# Patient Record
Sex: MALE
[De-identification: names, ages, dates, MRNs, and addresses within clinical notes are randomized per-mention and may not be internally consistent; named-entity substitution may affect disease eponyms.]

---

## 2017-06-02 ENCOUNTER — HOSPITAL ENCOUNTER (INPATIENT)
Dept: HOSPITAL 14 - H.ER | Age: 67
LOS: 4 days | Discharge: NURSING, REHAB OR LONG-TERM CARE | DRG: 871 | End: 2017-06-06
Attending: INTERNAL MEDICINE | Admitting: INTERNAL MEDICINE
Payer: MEDICAID

## 2017-06-02 DIAGNOSIS — A41.9: Primary | ICD-10-CM

## 2017-06-02 DIAGNOSIS — E78.00: ICD-10-CM

## 2017-06-02 DIAGNOSIS — N17.9: ICD-10-CM

## 2017-06-02 DIAGNOSIS — I10: ICD-10-CM

## 2017-06-02 DIAGNOSIS — Z87.01: ICD-10-CM

## 2017-06-02 DIAGNOSIS — E11.649: ICD-10-CM

## 2017-06-02 DIAGNOSIS — Z95.5: ICD-10-CM

## 2017-06-02 DIAGNOSIS — A04.7: ICD-10-CM

## 2017-06-02 DIAGNOSIS — I25.10: ICD-10-CM

## 2017-06-02 DIAGNOSIS — B96.20: ICD-10-CM

## 2017-06-02 DIAGNOSIS — Z79.82: ICD-10-CM

## 2017-06-02 DIAGNOSIS — E86.0: ICD-10-CM

## 2017-06-02 DIAGNOSIS — R65.20: ICD-10-CM

## 2017-06-02 DIAGNOSIS — G93.41: ICD-10-CM

## 2017-06-02 DIAGNOSIS — N39.0: ICD-10-CM

## 2017-06-02 DIAGNOSIS — E66.9: ICD-10-CM

## 2017-06-02 DIAGNOSIS — E87.6: ICD-10-CM

## 2017-06-02 DIAGNOSIS — L89.152: ICD-10-CM

## 2017-06-02 DIAGNOSIS — L97.519: ICD-10-CM

## 2017-06-02 DIAGNOSIS — Z87.891: ICD-10-CM

## 2017-06-02 DIAGNOSIS — L60.8: ICD-10-CM

## 2017-06-02 DIAGNOSIS — Z89.421: ICD-10-CM

## 2017-06-02 LAB
ALBUMIN/GLOB SERPL: 1.1 {RATIO} (ref 1–2.1)
ALP SERPL-CCNC: 77 U/L (ref 38–126)
ALT SERPL-CCNC: 29 U/L (ref 21–72)
APTT BLD: 27 SECONDS (ref 23.3–32.5)
ARTERIAL  BLOOD GAS MODE: (no result)
ARTERIAL PATENCY WRIST A: YES
AST SERPL-CCNC: 22 U/L (ref 17–59)
BACTERIA #/AREA URNS HPF: (no result) /[HPF]
BASOPHILS # BLD AUTO: 0.1 K/UL (ref 0–0.2)
BASOPHILS NFR BLD: 0.6 % (ref 0–2)
BILIRUB SERPL-MCNC: 0.8 MG/DL (ref 0.2–1.3)
BILIRUB UR-MCNC: (no result) MG/DL
BUN SERPL-MCNC: 40 MG/DL (ref 9–20)
CALCIUM SERPL-MCNC: 9.1 MG/DL (ref 8.4–10.2)
CHLORIDE SERPL-SCNC: 97 MMOL/L (ref 98–107)
CO2 SERPL-SCNC: 27 MMOL/L (ref 22–30)
COLOR UR: YELLOW
EOSINOPHIL # BLD AUTO: 0.4 K/UL (ref 0–0.7)
EOSINOPHIL NFR BLD: 2 % (ref 0–4)
ERYTHROCYTE [DISTWIDTH] IN BLOOD BY AUTOMATED COUNT: 16.6 % (ref 11.5–14.5)
GLOBULIN SER-MCNC: 3.3 GM/DL (ref 2.2–3.9)
GLUCOSE SERPL-MCNC: 51 MG/DL (ref 75–110)
GLUCOSE UR STRIP-MCNC: >=500 MG/DL
HCO3 BLDA-SCNC: 26 MMOL/L (ref 21–28)
HCT VFR BLD CALC: 40.1 % (ref 35–51)
KETONES UR STRIP-MCNC: NEGATIVE MG/DL
LEUKOCYTE ESTERASE UR-ACNC: (no result) LEU/UL
LYMPHOCYTES # BLD AUTO: 2.3 K/UL (ref 1–4.3)
LYMPHOCYTES NFR BLD AUTO: 11.6 % (ref 20–40)
MAGNESIUM SERPL-MCNC: 1.6 MG/DL (ref 1.6–2.3)
MCH RBC QN AUTO: 25.7 PG (ref 27–31)
MCHC RBC AUTO-ENTMCNC: 32 G/DL (ref 33–37)
MCV RBC AUTO: 80.2 FL (ref 80–94)
MONOCYTES # BLD: 1.9 K/UL (ref 0–0.8)
MONOCYTES NFR BLD: 9.6 % (ref 0–10)
NEUTROPHILS # BLD: 15.1 K/UL (ref 1.8–7)
NEUTROPHILS NFR BLD AUTO: 76.2 % (ref 50–75)
NRBC BLD AUTO-RTO: 0.1 % (ref 0–0)
PH BLDA: 7.37 [PH] (ref 7.35–7.45)
PH UR STRIP: 5 [PH] (ref 5–8)
PHOSPHATE SERPL-MCNC: 4.1 MG/DL (ref 2.5–4.5)
PLATELET # BLD: 225 K/UL (ref 130–400)
PMV BLD AUTO: 9.2 FL (ref 7.2–11.7)
PO2 BLDA: 102 MM/HG (ref 80–100)
POTASSIUM SERPL-SCNC: 3.6 MMOL/L (ref 3.6–5)
PROT SERPL-MCNC: 6.9 G/DL (ref 6.3–8.2)
PROT UR STRIP-MCNC: 100 MG/DL
RBC # UR STRIP: NEGATIVE /UL
RBC #/AREA URNS HPF: 16 /HPF (ref 0–3)
SODIUM SERPL-SCNC: 138 MMOL/L (ref 132–148)
SP GR UR STRIP: 1.02 (ref 1–1.03)
UROBILINOGEN UR-MCNC: (no result) MG/DL (ref 0.2–1)
WBC # BLD AUTO: 19.8 K/UL (ref 4.8–10.8)
WBC #/AREA URNS HPF: 4111 /HPF (ref 0–5)
WBC CLUMPS # UR AUTO: (no result) /HPF

## 2017-06-02 RX ADMIN — WATER SCH MLS/HR: 1 INJECTION INTRAMUSCULAR; INTRAVENOUS; SUBCUTANEOUS at 22:43

## 2017-06-02 RX ADMIN — INSULIN LISPRO SCH: 100 INJECTION, SOLUTION INTRAVENOUS; SUBCUTANEOUS at 13:48

## 2017-06-02 RX ADMIN — DEXTROSE AND SODIUM CHLORIDE SCH MLS/HR: 5; 450 INJECTION, SOLUTION INTRAVENOUS at 17:06

## 2017-06-02 RX ADMIN — WATER SCH MLS/HR: 1 INJECTION INTRAMUSCULAR; INTRAVENOUS; SUBCUTANEOUS at 13:54

## 2017-06-02 RX ADMIN — INSULIN LISPRO SCH: 100 INJECTION, SOLUTION INTRAVENOUS; SUBCUTANEOUS at 16:53

## 2017-06-02 RX ADMIN — WATER SCH MLS/HR: 1 INJECTION INTRAMUSCULAR; INTRAVENOUS; SUBCUTANEOUS at 17:02

## 2017-06-02 RX ADMIN — DEXTROSE AND SODIUM CHLORIDE SCH MLS/HR: 5; 450 INJECTION, SOLUTION INTRAVENOUS at 13:47

## 2017-06-02 RX ADMIN — INSULIN LISPRO SCH: 100 INJECTION, SOLUTION INTRAVENOUS; SUBCUTANEOUS at 07:59

## 2017-06-02 RX ADMIN — METRONIDAZOLE SCH MLS/HR: 500 INJECTION, SOLUTION INTRAVENOUS at 13:48

## 2017-06-02 RX ADMIN — INSULIN LISPRO SCH: 100 INJECTION, SOLUTION INTRAVENOUS; SUBCUTANEOUS at 22:41

## 2017-06-02 RX ADMIN — ENOXAPARIN SODIUM SCH: 40 INJECTION SUBCUTANEOUS at 16:54

## 2017-06-02 NOTE — CP.PCM.HP
History of Present Illness





- History of Present Illness


History of Present Illness: 





PCP: Teofilo Steele MD





Chief Complaint: Fever/ Hypotension





HPI: The hx is obtained from the nursing Home's notes as the patient is very 

sleepy. He is a 66 years old male r with hx of DM II, sent from the Nursing 

home with Fever and Hypotension. He denies chest Pain , SOB, Abdominal pain, 

cough, n/v. In the ED he was found to be having diarrhea.








PMH. DM II, probably CHF





PSH. right second toe partial amputation





SH:  living in a Nursing home/Assist Living





FH:  No Known Family Hx





Allergies: NKDA








Present on Admission





- Present on Admission


Any Indicators Present on Admission: No


History of DVT/PE: No


History of Uncontrolled Diabetes: No


Urinary Catheter: No


Decubitus Ulcer Present: No





Review of Systems





- Review of Systems


Review of Systems: 





Review of system is limited because the patient is sleepy more than lethargic.





Past Patient History





- Past Medical History & Family History


Past Medical History?: Yes





- Past Social History


Smoking Status: Unknown


Chewing Tobacco Use: No


Cigar Use: No


Alcohol: Other


Home Situation {Lives}: Nursing Home





- CARDIAC


Hx Cardiac Disorders: No





- PULMONARY


Hx Respiratory Disorders: No





- NEUROLOGICAL


Hx Neurological Disorder: No





- HEENT


Hx HEENT Problems: No





- RENAL


Hx Chronic Kidney Disease: No





- ENDOCRINE/METABOLIC


Hx Endocrine Disorders: Yes


Hx Diabetes Mellitus Type 2: Yes





- HEMATOLOGICAL/ONCOLOGICAL


Hx Blood Disorders: No





- INTEGUMENTARY


Hx Dermatological Problems: No





- MUSCULOSKELETAL/RHEUMATOLOGICAL


Hx Musculoskeletal Disorders: No





- GASTROINTESTINAL


Hx Gastrointestinal Disorders: No





- GENITOURINARY/GYNECOLOGICAL


Hx Genitourinary Disorders: No





- PSYCHIATRIC


Hx Psychophysiologic Disorder: No





- SURGICAL HISTORY


Hx Surgeries: Yes


Other/Comment: Righe second toe partial amputation





- ANESTHESIA


Hx Anesthesia: Yes


Hx Anesthesia Reactions: No





Meds


Allergies/Adverse Reactions: 


 Allergies











Allergy/AdvReac Type Severity Reaction Status Date / Time


 


No Known Allergies Allergy   Verified 06/02/17 02:23














Physical Exam





- Constitutional


Appears: In Acute Distress





- Head Exam


Head Exam: ATRAUMATIC, NORMAL INSPECTION, NORMOCEPHALIC





- Eye Exam


Eye Exam: EOMI, Normal appearance


Pupil Exam: NORMAL ACCOMODATION





- ENT Exam


ENT Exam: Mucous Membranes Moist, Normal External Ear Exam





- Neck Exam


Neck exam: Positive for: Normal Inspection.  Negative for: Lymphadenopathy, 

Tenderness





- Respiratory Exam


Respiratory Exam: Clear to Auscultation Bilateral.  absent: Rales, Rhonchi, 

Wheezes





- Cardiovascular Exam


Cardiovascular Exam: REGULAR RHYTHM, RRR, +S1, +S2





- GI/Abdominal Exam


Additional comments: 





Obese, soft, non tender, no viceromegales, +ve bowel sounds





- Rectal Exam


Rectal Exam: Deferred





- Extremities Exam


Additional comments: 





Distal half of the right leg with hyperpigmented blackish brown colored 

leathery skin, tender to deep palpation. The left leg ankle region is also 

tender to palpation. Pedalis dorsales are deminished bilateral, Trace edema





- Back Exam


Additional comments: 





Stage I and II ulceration s at the sacral and lower back region.





- Neurological Exam


Neurological exam: CN II-XII Intact, Reflexes Normal


Additional comments: 





Sleepy, no apparent focal deficit





- Psychiatric Exam


Psychiatric exam: Flat Affect





- Skin


Skin Exam: Dry, Intact, Normal Color, Warm





Results





- Vital Signs


Recent Vital Signs: 





 Last Vital Signs











Temp  100.3 F H  06/02/17 03:07


 


Pulse  90   06/02/17 03:07


 


Resp  18   06/02/17 03:07


 


BP  91/49 L  06/02/17 03:07


 


Pulse Ox  100   06/02/17 04:25














- Labs


Result Diagrams: 


 06/02/17 03:33





 06/02/17 03:33


Labs: 





 Laboratory Results - last 24 hr











  06/02/17 06/02/17 06/02/17





  02:37 03:26 03:33


 


WBC    19.8 H


 


RBC    5.00


 


Hgb    12.8


 


Hct    40.1


 


MCV    80.2


 


MCH    25.7 L


 


MCHC    32.0 L


 


RDW    16.6 H


 


Plt Count    225


 


MPV    9.2


 


Neut % (Auto)    76.2 H


 


Lymph % (Auto)    11.6 L


 


Mono % (Auto)    9.6


 


Eos % (Auto)    2.0


 


Baso % (Auto)    0.6


 


Neut #    15.1 H


 


Lymph #    2.3


 


Mono #    1.9 H


 


Eos #    0.4


 


Baso #    0.1


 


PT   


 


INR   


 


APTT   


 


pCO2   47 H 


 


pO2   102 H 


 


HCO3   26.0 


 


ABG pH   7.37 


 


ABG Total CO2   28.6 H 


 


ABG O2 Saturation   99.3 H 


 


ABG Base Excess   1.3 


 


Edwar Test   Yes 


 


ABG Potassium   3.1 L 


 


A-a O2 Difference   124.0 


 


Sodium   135.0 


 


Chloride   104.0 


 


Glucose   38 L* 


 


Lactate   0.8 


 


Vent Mode   5l nc 


 


FiO2   40.0 


 


Blood Gas Comments   5l nc 


 


Crit Value Called To   Khushi jay md 


 


Crit Value Called By   333 


 


Crit Value Read Back   Y 


 


Blood Gas Notified Time   335 


 


Potassium   


 


Carbon Dioxide   


 


Anion Gap   


 


BUN   


 


Creatinine   


 


Est GFR ( Amer)   


 


Est GFR (Non-Af Amer)   


 


POC Glucose (mg/dL)  62 L  


 


Random Glucose   


 


Lactic Acid   


 


Calcium   


 


Phosphorus   


 


Magnesium   


 


Total Bilirubin   


 


AST   


 


ALT   


 


Alkaline Phosphatase   


 


Total Protein   


 


Albumin   


 


Globulin   


 


Albumin/Globulin Ratio   


 


Arterial Blood Potassium   3.1 L 


 


Urine Color   


 


Urine Clarity   


 


Urine pH   


 


Ur Specific Gravity   


 


Urine Protein   


 


Urine Glucose (UA)   


 


Urine Ketones   


 


Urine Blood   


 


Urine Nitrate   


 


Urine Bilirubin   


 


Urine Urobilinogen   


 


Ur Leukocyte Esterase   


 


Urine RBC (Auto)   


 


Urine WBC Clumps (Auto)   


 


Urine Microscopic WBC   


 


Urine Bacteria   


 


Hyaline Casts   














  06/02/17 06/02/17 06/02/17





  03:33 03:33 03:33


 


WBC   


 


RBC   


 


Hgb   


 


Hct   


 


MCV   


 


MCH   


 


MCHC   


 


RDW   


 


Plt Count   


 


MPV   


 


Neut % (Auto)   


 


Lymph % (Auto)   


 


Mono % (Auto)   


 


Eos % (Auto)   


 


Baso % (Auto)   


 


Neut #   


 


Lymph #   


 


Mono #   


 


Eos #   


 


Baso #   


 


PT   11.4 H 


 


INR   1.10 H 


 


APTT   27.0 


 


pCO2   


 


pO2   


 


HCO3   


 


ABG pH   


 


ABG Total CO2   


 


ABG O2 Saturation   


 


ABG Base Excess   


 


Edwar Test   


 


ABG Potassium   


 


A-a O2 Difference   


 


Sodium  138  


 


Chloride  97 L  


 


Glucose   


 


Lactate   


 


Vent Mode   


 


FiO2   


 


Blood Gas Comments   


 


Crit Value Called To   


 


Crit Value Called By   


 


Crit Value Read Back   


 


Blood Gas Notified Time   


 


Potassium  3.6  


 


Carbon Dioxide  27  


 


Anion Gap  18  


 


BUN  40 H  


 


Creatinine  1.9 H  


 


Est GFR ( Amer)  43  


 


Est GFR (Non-Af Amer)  35  


 


POC Glucose (mg/dL)   


 


Random Glucose  51 L  


 


Lactic Acid   


 


Calcium  9.1  


 


Phosphorus  4.1  


 


Magnesium  1.6  


 


Total Bilirubin  0.8  


 


AST  22  


 


ALT  29  


 


Alkaline Phosphatase  77  


 


Total Protein  6.9  


 


Albumin  3.6  


 


Globulin  3.3  


 


Albumin/Globulin Ratio  1.1  


 


Arterial Blood Potassium   


 


Urine Color    Yellow


 


Urine Clarity    Turbid


 


Urine pH    5.0


 


Ur Specific Gravity    1.022


 


Urine Protein    100


 


Urine Glucose (UA)    >=500


 


Urine Ketones    Negative


 


Urine Blood    Negative


 


Urine Nitrate    Negative


 


Urine Bilirubin    Small


 


Urine Urobilinogen    0.2-1.0


 


Ur Leukocyte Esterase    Mod


 


Urine RBC (Auto)    16 H


 


Urine WBC Clumps (Auto)    Many H


 


Urine Microscopic WBC    4111 H


 


Urine Bacteria    Small


 


Hyaline Casts    0-2














  06/02/17 06/02/17





  03:33 04:15


 


WBC  


 


RBC  


 


Hgb  


 


Hct  


 


MCV  


 


MCH  


 


MCHC  


 


RDW  


 


Plt Count  


 


MPV  


 


Neut % (Auto)  


 


Lymph % (Auto)  


 


Mono % (Auto)  


 


Eos % (Auto)  


 


Baso % (Auto)  


 


Neut #  


 


Lymph #  


 


Mono #  


 


Eos #  


 


Baso #  


 


PT  


 


INR  


 


APTT  


 


pCO2  


 


pO2  


 


HCO3  


 


ABG pH  


 


ABG Total CO2  


 


ABG O2 Saturation  


 


ABG Base Excess  


 


Edwar Test  


 


ABG Potassium  


 


A-a O2 Difference  


 


Sodium  


 


Chloride  


 


Glucose  


 


Lactate  


 


Vent Mode  


 


FiO2  


 


Blood Gas Comments  


 


Crit Value Called To  


 


Crit Value Called By  


 


Crit Value Read Back  


 


Blood Gas Notified Time  


 


Potassium  


 


Carbon Dioxide  


 


Anion Gap  


 


BUN  


 


Creatinine  


 


Est GFR ( Amer)  


 


Est GFR (Non-Af Amer)  


 


POC Glucose (mg/dL)   44 L


 


Random Glucose  


 


Lactic Acid  1.8 


 


Calcium  


 


Phosphorus  


 


Magnesium  


 


Total Bilirubin  


 


AST  


 


ALT  


 


Alkaline Phosphatase  


 


Total Protein  


 


Albumin  


 


Globulin  


 


Albumin/Globulin Ratio  


 


Arterial Blood Potassium  


 


Urine Color  


 


Urine Clarity  


 


Urine pH  


 


Ur Specific Gravity  


 


Urine Protein  


 


Urine Glucose (UA)  


 


Urine Ketones  


 


Urine Blood  


 


Urine Nitrate  


 


Urine Bilirubin  


 


Urine Urobilinogen  


 


Ur Leukocyte Esterase  


 


Urine RBC (Auto)  


 


Urine WBC Clumps (Auto)  


 


Urine Microscopic WBC  


 


Urine Bacteria  


 


Hyaline Casts  














- EKG Data


EKG comments: 





NSR 99/min


RBBB





- Imaging and Cardiology


  ** Chest x-ray


Status: Image reviewed by me


Additional comment: 





Poor inspiration, Obliteration at the left base


Probably mild infiltrate at right apex





Assessment & Plan





- Assessment and Plan (Free Text)


Assessment: 





#. UTI


#Severe Sepsis


#. Leukocytosis


#. MAREK


#.DM II with Hypoglycemia


#. Diarrhea


Plan: 





66 years old male r with hx of DM II, sent from the Nursing home with Fever and 

Hypotension. He denies chest Pain , SOB, Abdominal pain, cough, n/v. In the ED 

he was found to be having diarrhea.





#. UTI 


-Zosyn 3.375mg IVPB Q6H


- Follow up Urine culture





#Severe Sepsis with a leukocytosis of 19.8/temperature of > 100F and Initial BP 

Of 82/44mmHg


- consult Dr neumann Infectious disease


- Follow blood culture and urine culture


- Zosyn IVPB Q6H





#.Neutrophilic Leukocytosis


- follow WBC





#. MAREK


- IV Fluid with NS at 150mls/hr


- follow Renal labs





#.DM II with Hypoglycemia probably due to poor food intake while taking anti 

diabetic medication


- Hold Invokana and Metformin/Victosa


- Diabetic diet


- Novolog sliding scale according toaccucheck


- HbA1c





#. Diarrhea r/o C diff


- stool for C Diff and C&S


- IV Fluids





#. DVT Prophylaxis with lovenox





#. Code status: Full





- Date & Time


Date: 06/02/17


Time: 05:14

## 2017-06-02 NOTE — CARD
--------------- APPROVED REPORT --------------





EKG Measurement

Heart [id number]

[id number]

[id number]

[id number]

[id number]



<Conclusion>

Normal sinus rhythm

Left axis deviation

Right bundle branch block

Septal infarct, age undetermined

Abnormal ECG

## 2017-06-02 NOTE — CP.PCM.CON
History of Present Illness





- History of Present Illness


History of Present Illness: 





66 years old male r with hx of DM II, sent from the Nursing home with Fever and 

Hypotension. He denies chest Pain , SOB, Abdominal pain, cough, n/v. In the ED 

he was found to be having diarrhea.








PMH. DM II, probably CHF





PSH. right second toe partial amputation





SH:  living in a Nursing home/Assist Living





FH:  No Known Family Hx





Allergies: NKDA








Past Patient History





- Past Medical History & Family History


Past Medical History?: Yes





- Past Social History


Smoking Status: Former Smoker





- CARDIAC


Hx Cardiac Disorders: No


Hx Hypercholesterolemia: Yes


Hx Hypertension: Yes


Other/Comment: cad with stents





- PULMONARY


Hx Respiratory Disorders: Yes


Hx Pneumonia: Yes





- NEUROLOGICAL


Hx Neurological Disorder: No





- HEENT


Hx HEENT Problems: No





- RENAL


Other/Comment: ESRD





- ENDOCRINE/METABOLIC


Hx Endocrine Disorders: Yes


Hx Diabetes Mellitus Type 2: Yes





- HEMATOLOGICAL/ONCOLOGICAL


Hx Blood Disorders: No





- INTEGUMENTARY


Other/Comment: Hx Decubitus.  Hx perineal/buttocks excoriation





- MUSCULOSKELETAL/RHEUMATOLOGICAL


Hx Musculoskeletal Disorders: No


Hx Falls: No





- GASTROINTESTINAL


Hx Gastrointestinal Disorders: Yes


Other/Comment: c diff





- GENITOURINARY/GYNECOLOGICAL


Hx Genitourinary Disorders: No





- PSYCHIATRIC


Hx Psychophysiologic Disorder: No


Hx Substance Use: No





- SURGICAL HISTORY


Hx Surgeries: Yes


Other/Comment: Righe second toe partial amputation





- ANESTHESIA


Hx Anesthesia: Yes


Hx Anesthesia Reactions: No





Meds


Allergies/Adverse Reactions: 


 Allergies











Allergy/AdvReac Type Severity Reaction Status Date / Time


 


No Known Allergies Allergy   Verified 06/02/17 02:23














- Medications


Medications: 


 Current Medications





Acetaminophen (Tylenol 325mg Tab)  650 mg PO Q6 PRN


   PRN Reason: temp> 101 or mild pain 


Aspirin (Ecotrin)  81 mg PO DAILY [facility]


Atorvastatin Calcium (Lipitor)  10 mg PO HS [facility]


Bacitracin (Bacitracin Oint)  1 applic TOP DAILY [facility]


Carvedilol (Coreg)  6.25 mg PO HS [facility]


Cholecalciferol (Vitamin D)  1,000 iu PO DAILY [facility]


Enoxaparin Sodium (Lovenox)  40 mg SC DAILY [facility]


   PRN Reason: Protocol


Gabapentin (Neurontin)  600 mg PO TID [facility]


Home Med (Cholecalciferol (Vitamin D3) [Vitamin D3])  1,000 unit PO DAILY [facility]


Piperacillin Sod/Tazobactam (Sod 3.375 gm/ Sodium Chloride)  100 mls @ 100 mls/

hr IVPB Q6 [facility]


Sodium Chloride (Sodium Chloride 0.9%)  1,000 mls @ 150 mls/hr IV .Q6H40M [facility]


   Stop: 06/03/17 07:32


Dextrose/Sodium Chloride (Dextrose 5%/0.45% Ns 1000 Ml)  1,000 mls @ 150 mls/hr 

IV .Q6H40M [facility]


   Stop: 06/03/17 07:53


Metronidazole (Flagyl 500mg/100ml Ns)  100 mls @ 100 mls/hr IVPB Q8 [facility]


Insulin Human Lispro (Humalog)  0 units SC ACHS [facility]


   PRN Reason: Protocol


   Last Admin: 06/02/17 07:59 Dose:  Not Given


Pantoprazole Sodium (Protonix Ec Tab)  40 mg PO DAILY [facility]


Ramipril (Altace)  5 mg PO DAILY [facility]











Results





- Vital Signs


Recent Vital Signs: 


 Last Vital Signs











Temp  97.8 F   06/02/17 12:28


 


Pulse  86   06/02/17 12:45


 


Resp  18   06/02/17 12:45


 


BP  103/55 L  06/02/17 12:28


 


Pulse Ox  98   06/02/17 12:28














- Labs


Result Diagrams: 


 06/02/17 03:33





 06/02/17 03:33


Labs: 


 Laboratory Results - last 24 hr











  06/02/17 06/02/17 06/02/17





  05:16 11:01 13:18


 


POC Glucose (mg/dL)   70  114 H


 


C. difficile Ag & Toxin  Positive antigen

## 2017-06-02 NOTE — CP.CCUPN
CCU Subjective





- Physician Review


Subjective (Free Text): 











***INTENSIVIST PROGRESS NOTE***


Patient examined, interim events reviewed, discussed with PMD:





66M admitted after transfer from NH for fever and hypotension. Patient is a 

poor historian and information obtained from the review of available chart 

records. Initial BP 80/40s and after 3 liters NSS, SBP celestine to approx. 100. He 

was afebrile on presentation, but search for source of sepsis noted only 

moderate pyuria, but neg nitrite in UA. CXR not conclusive enough for any 

definite consolidation and no old films for comparison. He exhibited frequent 

loose stools/ diarrheal BMs in the ER. Mental status slightly improved after 

given supplemental IV dextrose for hypoglycemia.  Presently sleeping but 

arousable to verbal stimuli, speaks Egyptian, does not appear distressed nor 

uncomfortable.





Other vitals:  Afebrile, Tmax 100.3F  /72 HR 96, 100% SPO2 on NC. 


Allergies: NKDA


NH meds:  Invokana, Tresiba, Victoza, Glucophage; Altace, Coreg, Lasix, 

Gabapentin, Ecotrin, Atorvastatin, Vit D3, Linzess, meclizine, Naprosyn, 

Omeprazole.


ROS: as above, no other pertinent negs or positives on 10 system review. 


PMFSH:  all nursing and historical notes reviewed, no new pertinent data 

relevant to current problems. 





No other distress noted: EXAM-


HEENT: no icterus, pupils equal and reactive


NECK: no visible JVD, supple, carotids equal upstroke bilat/no bruits


CHEST: decreased BS bases, no wheezes 


HEART:  regular, distant, S1S2, no murmur audible, no rubs.


ABD:  soft, no increased distention, no focal tenderness,  no HSM. BS hypoactive

, 


EXT:   no peripheral/ digital cyanosis, no calf tenderness or palpable cords, 

distal pulses intact and symmetrical


NEURO:   no gross focal motor deficits


SKIN:   no rashes





LABS: 


WBC= 19.8


HGB= 12.8


PLTs= 225K








Na= 138


K= 3.6


HCO3= 27


BUN/Cr= 40/1.9


BS= 51








CXR:  poor inspiratory film, ?/ bi-apical haziness, significant left effusion.  

(my interp). 


EKG: sinus 100, RBBB, other non-specific ST-T changes. 








MAJOR PROBLEMS NOW:


1.   Severe sepsis, r/o bacteremia, r/o AMI


2.   Urosepsis


3.   Azotemia / Dehydration, r/o ATN  MAREK


4.   Hypoglycemia with DM II


5.   Metabolic encephalopathy





PLAN:


1.    IVF hydration / fluid challenges.


2.   Supplemental Dextrose infusion with frequent Accuchecks.


3.   Panculture, empiric abx coverage started in ER with Zosyn, consider add 

Vanco and Cipro for coverage against hospital acquired organisms as he is from 

the NH. 


4.   Stool for C Diff Ag


5.   Serial Lactate levels until normalized.


6.   Hold or stop these NH meds:  Invokana, Tresiba, Victoza, Glucophage; Altace

, Coreg, Lasix, Gabapentin.


7.    Clarify any Advance Directives.

## 2017-06-02 NOTE — RAD
HISTORY:

Sepsis Patient  



COMPARISON:

None available 



FINDINGS:



LUNGS:

Bilateral apical opacity, uncertain significance.  Possible pleural 

thickening.  Follow-up advised.  Bibasilar linear subsegmental 

atelectasis.



PLEURA:

Slight blunting left costophrenic angle may reflect small pleural 

effusion or chronic pleural thickening.  No evidence of right pleural 

effusion. No pneumothorax.



CARDIOVASCULAR:

Normal.



OSSEOUS STRUCTURES:

No significant abnormalities.



VISUALIZED UPPER ABDOMEN:

Normal.



OTHER FINDINGS:

None.



IMPRESSION:

Bilateral apical hazy opacity, uncertain significance. Followup 

advised with PA and lateral chest radiography when clinically 

feasible.  Bibasilar linear atelectasis.  Possible very small left 

pleural effusion.

## 2017-06-02 NOTE — ED PDOC
HPI: General Adult


Time Seen by Provider: 17 02:27


Chief Complaint (Nursing): Fever


Chief Complaint (Provider): hypotensive, fever 


History Per: Patient


History/Exam Limitations: no limitations


Onset/Duration Of Symptoms: Mins


Have you had recent travel within the past 21 days to any of the following 

countries: [place], [place], [place] or [place]?: No


Current Symptoms Are (Timing): Still Present


Additional Complaint(s): 


65yo male presents to the ED, sent by nursing home, for evaluation of fever and 

low blood pressure. Patient denies chest pain, leg pain, abdominal pain, SOB, 

cough, n/v/d, or any other medical complaints. 





PCP: Dr. Steele 





Past Medical History


Reviewed: Historical Data, Nursing Documentation, Vital Signs


Vital Signs: 


 Last Vital Signs











Temp  97.9 F   17 08:00


 


Pulse  83   17 08:00


 


Resp  10 L  17 08:00


 


BP  100/53 L  17 08:00


 


Pulse Ox  100   17 08:00














- Medical History


PMH: CAD, Diabetes, HTN





- Surgical History


Surgical History: No Surg Hx


Other surgeries: right second toe partial amputation





- Family History


Family History: States: No Known Family Hx





- Living Arrangements


Living Arrangements: Nursing Home/[facility]ist [facility]





- Home Medications


Home Medications: 


 Ambulatory Orders











 Medication  Instructions  Recorded


 


Acetaminophen [Tylenol 325mg tab] 650 mg PO Q6 PRN 17


 


Aspirin [Adult Low Dose Aspirin EC] 81 mg PO DAILY 17


 


Atorvastatin Calcium [Atorvastatin 10 mg PO HS 17





Calcium]  


 


Bacitracin OINT [Bacitracin OINT] 1 appl TOP DAILY 17


 


Canagliflozin [Invokana] 300 mg PO DAILY 17


 


Carvedilol [Coreg] 6.25 mg PO HS 17


 


Cholecalciferol (Vitamin D3) 1,000 unit PO DAILY 17





[Vitamin D3]  


 


Furosemide [Lasix] 40 mg PO DAILY 17


 


Gabapentin [Neurontin] 600 mg PO TID 17


 


Insulin Degludec [Tresiba 60 unit SC BID 17





Flextouch U-100]  


 


Linaclotide [Linzess] 145 mcg PO DAILY 17


 


Liraglutide [Victoza 2-Surya] 1.8 mg SC HS 17


 


Magnesium Hydroxide [Milk Of 30 ml PO Q72 PRN 17





Magnesia]  


 


Meclizine HCl [Meclizine HCl] 12.5 mg PO PRN PRN 17


 


Metformin HCl [Glucophage] 1,000 mg PO BID 17


 


Naproxen [Naprosyn Tab] 375 mg PO Q12 PRN 17


 


Omeprazole [Omeprazole] 40 mg PO DAILY 17


 


Ramipril [Altace] 5 mg PO DAILY 17


 


S.boulardii/B.coagulans/Fos 1 cap PO DAILY 17





[Diff-Stat 471 mg Capsule]  














- Allergies


Allergies/Adverse Reactions: 


 Allergies











Allergy/AdvReac Type Severity Reaction Status Date / Time


 


No Known Allergies Allergy   Verified 17 02:23














Review of Systems


ROS Statement: Except As Marked, All Systems Reviewed And Found Negative


Constitutional: Positive for: Fever


Cardiovascular: Negative for: Chest Pain


Respiratory: Negative for: Cough, Shortness of Breath


Gastrointestinal: Negative for: Nausea, Vomiting, Abdominal Pain, Diarrhea


Musculoskeletal: Negative for: Leg Pain





Physical Exam





- Reviewed


Nursing Documentation Reviewed: Yes


Vital Signs Reviewed: Yes





- Physical Exam


Appears: Positive for: No Acute Distress.  Negative for: Well (ill appearing )


Head Exam: Positive for: ATRAUMATIC, NORMAL INSPECTION, NORMOCEPHALIC


Skin: Positive for: Diaphoresis.  Negative for: Warm (cool extremities )


Eye Exam: Positive for: Normal appearance, EOMI, PERRL


ENT: Positive for: Normal ENT Inspection, Other (moist mucous membranes )


Neck: Positive for: Normal, Painless ROM, Supple


Cardiovascular/Chest: Positive for: Tachycardia (but regular rhythm ).  

Negative for: Murmur


Respiratory: Positive for: Normal Breath Sounds.  Negative for: Wheezing, 

Respiratory Distress


Pulses-Dorsalis Pedis (L): 2+


Pulses-Dorsalis Pedis (R): 2+


Pulses-Radial (L): 2+


Pulses-Radial (R): 2+


Gastrointestinal/Abdominal: Positive for: Normal Exam, Soft.  Negative for: 

Tenderness, Distended


Back: Positive for: Normal Inspection, Other (decubitus pressure ulcers to 

buttock and perineum stage 2 ).  Negative for: L CVA Tenderness, R CVA 

Tenderness


Extremity: Positive for: Normal ROM, Other (RLE dermatitis w/ discoloration 

that is warm to touch, right second toe partial amputation ).  Negative for: 

Tenderness


Neurologic/Psych: Positive for: Alert, Oriented





- Laboratory Results


Result Diagrams: 


 17 05:30





 17 05:30





- ECG


O2 Sat by Pulse Oximetry: 100


Pulse Ox Interpretation: Normal (RA)





- Radiology


X-Ray: Interpreted by Me, Viewed By Me


X-Ray Interpretation: No Acute Disease





- Critical Care


Total Time (In Min): 30





Medical Decision Making


Medical Decision Makin:


Impression: sent from nursing home for eval of fever and hypotension





DDx: UTI vs pneumonia. Sepsis. Hypoglycemia. Acute diarrhea r/o Cdif.  





Plan:


ABG


EKG


Labs


CXR


IVF


blood and urine culture 


Zosyn IV


Flagyl IV











0345: Patient responded well to IVF w/ bp improved, systolic at 100. 











-------------------


Scribe Attestation:


Documented by TOSHIA Murillo acting as a scribe for Joslyn Puri MD.   

  


Provider Scribe Attestation:


All medical record entries made by the Scribe were at my direction and 

personally dictated by me. I


have reviewed the chart and agree that the record accurately reflects my 

personal performance of the


history, physical exam, medical decision making, and the department course for 

this patient. I have


also personally directed, reviewed, and agree with the discharge instructions 

and disposition.  





Disposition





- Clinical Impression


Clinical Impression: 


 Severe sepsis, UTI (urinary tract infection), Hypoglycemia








- Patient ED Disposition


Is Patient to be Admitted: Yes


Discussed With : Sunil Rice


Doctor Will See Patient In The: ED


Counseled Patient/Family Regarding: Studies Performed, Diagnosis





- Disposition


Disposition Time: 04:00


Condition: CRITICAL





- Pt Status Changed To:


Hospital Disposition Of: Inpatient





- Admit Certification


Admit to Inpatient:: After my assessment, the patient will require 

hospitalization for at least two midnights.  This is because of the severity of 

symptoms shown, intensity of services needed, and/or the medical risk in this 

patient being treated as an outpatient.





- POA


Present On Arrival: Pressure Ulcer

## 2017-06-02 NOTE — CARD
--------------- APPROVED REPORT --------------





EKG Measurement

Heart [id number]

CO 146P9

[id number]

[id number]

[id number]



<Conclusion>

Normal sinus rhythm

Left axis deviation

Right bundle branch block

Septal infarct, age undetermined

Abnormal ECG

## 2017-06-03 LAB
ALBUMIN/GLOB SERPL: 1 {RATIO} (ref 1–2.1)
ALP SERPL-CCNC: 75 U/L (ref 38–126)
ALT SERPL-CCNC: 22 U/L (ref 21–72)
AST SERPL-CCNC: 17 U/L (ref 17–59)
BASOPHILS # BLD AUTO: 0 K/UL (ref 0–0.2)
BASOPHILS NFR BLD: 0.3 % (ref 0–2)
BILIRUB SERPL-MCNC: 0.6 MG/DL (ref 0.2–1.3)
BUN SERPL-MCNC: 29 MG/DL (ref 9–20)
CALCIUM SERPL-MCNC: 8.4 MG/DL (ref 8.4–10.2)
CHLORIDE SERPL-SCNC: 103 MMOL/L (ref 98–107)
CO2 SERPL-SCNC: 27 MMOL/L (ref 22–30)
EOSINOPHIL # BLD AUTO: 0.5 K/UL (ref 0–0.7)
EOSINOPHIL NFR BLD: 4 % (ref 0–7)
EOSINOPHIL NFR BLD: 5.4 % (ref 0–4)
ERYTHROCYTE [DISTWIDTH] IN BLOOD BY AUTOMATED COUNT: 17 % (ref 11.5–14.5)
GLOBULIN SER-MCNC: 3 GM/DL (ref 2.2–3.9)
GLUCOSE SERPL-MCNC: 173 MG/DL (ref 75–110)
HCT VFR BLD CALC: 38.8 % (ref 35–51)
LG PLATELETS BLD QL SMEAR: PRESENT
LYMPHOCYTES # BLD AUTO: 0.8 K/UL (ref 1–4.3)
LYMPHOCYTES NFR BLD AUTO: 8.3 % (ref 20–40)
MCH RBC QN AUTO: 25.8 PG (ref 27–31)
MCHC RBC AUTO-ENTMCNC: 31.8 G/DL (ref 33–37)
MCV RBC AUTO: 81 FL (ref 80–94)
MONOCYTES # BLD: 0.9 K/UL (ref 0–0.8)
MONOCYTES NFR BLD: 8.5 % (ref 0–10)
NEUTROPHILS # BLD: 7.7 K/UL (ref 1.8–7)
NEUTROPHILS NFR BLD AUTO: 77.5 % (ref 50–75)
NEUTROPHILS NFR BLD AUTO: 79 % (ref 42–75)
NRBC BLD AUTO-RTO: 0.1 % (ref 0–0)
PLATELET # BLD: 171 K/UL (ref 130–400)
PMV BLD AUTO: 8.5 FL (ref 7.2–11.7)
POTASSIUM SERPL-SCNC: 3.2 MMOL/L (ref 3.6–5)
PROT SERPL-MCNC: 6.2 G/DL (ref 6.3–8.2)
SODIUM SERPL-SCNC: 139 MMOL/L (ref 132–148)
TOTAL CELLS COUNTED BLD: 100
WBC # BLD AUTO: 10 K/UL (ref 4.8–10.8)

## 2017-06-03 RX ADMIN — WATER SCH MLS/HR: 1 INJECTION INTRAMUSCULAR; INTRAVENOUS; SUBCUTANEOUS at 11:17

## 2017-06-03 RX ADMIN — POTASSIUM CHLORIDE SCH MLS/HR: 14.9 INJECTION, SOLUTION INTRAVENOUS at 09:27

## 2017-06-03 RX ADMIN — METRONIDAZOLE SCH MLS/HR: 500 INJECTION, SOLUTION INTRAVENOUS at 09:22

## 2017-06-03 RX ADMIN — DEXTROSE AND SODIUM CHLORIDE SCH MLS/HR: 5; 450 INJECTION, SOLUTION INTRAVENOUS at 00:34

## 2017-06-03 RX ADMIN — BACITRACIN SCH APPLIC: 500 OINTMENT TOPICAL at 16:53

## 2017-06-03 RX ADMIN — INSULIN LISPRO SCH: 100 INJECTION, SOLUTION INTRAVENOUS; SUBCUTANEOUS at 21:24

## 2017-06-03 RX ADMIN — POTASSIUM CHLORIDE SCH MLS/HR: 14.9 INJECTION, SOLUTION INTRAVENOUS at 11:16

## 2017-06-03 RX ADMIN — METRONIDAZOLE SCH MLS/HR: 500 INJECTION, SOLUTION INTRAVENOUS at 00:32

## 2017-06-03 RX ADMIN — INSULIN LISPRO SCH UNIT: 100 INJECTION, SOLUTION INTRAVENOUS; SUBCUTANEOUS at 16:54

## 2017-06-03 RX ADMIN — ENOXAPARIN SODIUM SCH MG: 40 INJECTION SUBCUTANEOUS at 09:24

## 2017-06-03 RX ADMIN — WATER SCH MLS/HR: 1 INJECTION INTRAMUSCULAR; INTRAVENOUS; SUBCUTANEOUS at 16:55

## 2017-06-03 RX ADMIN — PANTOPRAZOLE SODIUM SCH MG: 40 TABLET, DELAYED RELEASE ORAL at 09:24

## 2017-06-03 RX ADMIN — METRONIDAZOLE SCH MLS/HR: 500 INJECTION, SOLUTION INTRAVENOUS at 16:53

## 2017-06-03 RX ADMIN — WATER SCH MLS/HR: 1 INJECTION INTRAMUSCULAR; INTRAVENOUS; SUBCUTANEOUS at 03:52

## 2017-06-03 RX ADMIN — WATER SCH MLS/HR: 1 INJECTION INTRAMUSCULAR; INTRAVENOUS; SUBCUTANEOUS at 21:23

## 2017-06-03 RX ADMIN — INSULIN LISPRO SCH UNIT: 100 INJECTION, SOLUTION INTRAVENOUS; SUBCUTANEOUS at 11:41

## 2017-06-03 RX ADMIN — INSULIN LISPRO SCH UNIT: 100 INJECTION, SOLUTION INTRAVENOUS; SUBCUTANEOUS at 06:43

## 2017-06-03 NOTE — RAD
HISTORY:

f/u PNA  



COMPARISON:

06/02/2017 



FINDINGS:



LUNGS:

Hazy opacity in the left lung base likely from atelectasis versus 

pneumonia. Increased pulmonary vascular congestion.



PLEURA:

No significant pleural effusion identified.



CARDIOVASCULAR:

Enlarged cardiomediastinal silhouette.



OSSEOUS STRUCTURES:

The osseous structures demonstrate degenerative changes. 



VISUALIZED UPPER ABDOMEN:

Upper abdomen is suboptimally evaluated.



OTHER FINDINGS:

None.



IMPRESSION:

Essentially unchanged opacity in the left lung base likely 

atelectasis versus pneumonia. No significant interval change.

## 2017-06-03 NOTE — CP.PCM.PN
Subjective





- Date & Time of Evaluation


Date of Evaluation: 06/03/17


Time of Evaluation: 08:00





- Subjective


Subjective: 


Patient seen and examined bedside. Awake , alert and oriented. Lying in bed in 

NAD. Complains of pain to rectl area. Has episodesof dry cough.


As per patient, he has been having diarrhea for almost 1 month and was being 

treated in [facility] for infection. He also had urinary tract infection and bacteria 

in the blood diagnosed 1 month ago ansd was on antibiotics.


At present hemodynamically stable BP 07/73 HR 78 afebrile


Rectal tube in place with watery diarrhea 


I/O 1890/1675


WBC 10 K Hgb 12 BUN 29 K 3.2


CXR shows vascular congestion and pleural effusion





Objective





- Vital Signs/Intake and Output


Vital Signs (last 24 hours): 


 











Temp Pulse Resp BP Pulse Ox


 


 98.9 F   78   16   107/78   98 


 


 06/03/17 04:00  06/03/17 06:00  06/03/17 06:00  06/03/17 06:00  06/03/17 06:00








Intake and Output: 


 











 06/03/17 06/03/17





 06:59 18:59


 


Intake Total 1890 


 


Output Total 1675 


 


Balance 215 














- Medications


Medications: 


 Current Medications





Acetaminophen (Tylenol 325mg Tab)  650 mg PO Q6 PRN


   PRN Reason: temp> 101 or mild pain 


Aspirin (Ecotrin)  81 mg PO DAILY [facility]


Atorvastatin Calcium (Lipitor)  10 mg PO HS [facility]


   Last Admin: 06/02/17 22:43 Dose:  10 mg


Bacitracin (Bacitracin Oint)  1 applic TOP DAILY [facility]


Enoxaparin Sodium (Lovenox)  40 mg SC DAILY [facility]


   PRN Reason: Protocol


   Last Admin: 06/02/17 16:54 Dose:  Not Given


Home Med (Cholecalciferol (Vitamin D3) [Vitamin D3])  1,000 unit PO DAILY [facility]


Piperacillin Sod/Tazobactam (Sod 3.375 gm/ Sodium Chloride)  100 mls @ 100 mls/

hr IVPB Q6 [facility]


   Last Admin: 06/03/17 03:52 Dose:  100 mls/hr


Metronidazole (Flagyl 500mg/100ml Ns)  100 mls @ 100 mls/hr IVPB Q8 [facility]


   Last Admin: 06/03/17 00:32 Dose:  100 mls/hr


Insulin Human Lispro (Humalog)  0 units SC ACHS [facility]


   PRN Reason: Protocol


   Last Admin: 06/03/17 06:43 Dose:  1 unit


Pantoprazole Sodium (Protonix Ec Tab)  40 mg PO DAILY [facility]











- Labs


Labs: 


 





 06/03/17 05:30 





 06/03/17 05:30 





 











PT  11.4 SECONDS (9.6-11.2)  H  06/02/17  03:33    


 


INR  1.10  (0.92-1.08)  H  06/02/17  03:33    


 


APTT  27.0 SECONDS (23.3-32.5)   06/02/17  03:33    














- Constitutional


Appears: Non-toxic, No Acute Distress





- Head Exam


Head Exam: ATRAUMATIC, NORMAL INSPECTION, NORMOCEPHALIC





- Eye Exam


Eye Exam: EOMI, Normal appearance, PERRL


Pupil Exam: NORMAL ACCOMODATION





- ENT Exam


ENT Exam: Mucous Membranes Dry, Normal Exam





- Neck Exam


Neck Exam: Full ROM, Normal Inspection





- Respiratory Exam


Respiratory Exam: Decreased Breath Sounds (bibasilar ), Clear to Ausculation 

Bilateral, NORMAL BREATHING PATTERN.  absent: Rhonchi, Wheezes, Respiratory 

Distress





- Cardiovascular Exam


Cardiovascular Exam: REGULAR RHYTHM, RRR, +S1, +S2.  absent: JVD





- GI/Abdominal Exam


GI & Abdominal Exam: Soft, Normal Bowel Sounds.  absent: Distended, Guarding, 

Tenderness, Rebound





- Rectal Exam


Rectal Exam: Deferred





- Extremities Exam


Extremities Exam: Normal Capillary Refill, Pedal Edema (Right foot 2 +).  absent

: Calf Tenderness


Additional comments: 





RLE below knee skin hyperpigmentation blake to venous stasis








- Back Exam


Back Exam: NORMAL INSPECTION





- Neurological Exam


Neurological Exam: Alert, Awake, CN II-XII Intact, Oriented x3





- Psychiatric Exam


Psychiatric exam: Normal Affect





- Skin


Skin Exam: Dry, Normal Color, Warm





Assessment and Plan





- Assessment and Plan (Free Text)


Assessment: 


66 years old male with hx of DM II was  sent from the Nursing home/ [facility]  with 

Fever and Hypotension.As per patient he was treated recently for bacteremia, 

UTI and C.diff . Patient was found to be with AMS, hypotensive, febrile,

hypoglycemic with elevated WBC count, pyouria and diarrhea. He was admitted for 

severe sepsis and started on IVF and antibiotics. He responded well to IVF 

resuscitation.


At present in ICU , hemodynamically stable, afebrile with rectal tube in place 

and watery diarrhea


Stool positive for C.Dif Antigen 





1.Severe Sepsis 


Most likely source UTI , C.Diff colitis 


Presented with hypotension, hypoglycemia, febrile elevated WVBC count , 

diarrhea and pyuria


Responded well to fluid resuscitation


Abiola positive for C.Diff antigen


ID consultd 


continue Flagyl ad Zosyn IV


Follow up Blood and urine cultures





2. Metabolic encephalopathy


improved 


At present AAOx3 


continue treatment for underlying infection 


 


3. UTI 


UA showed large WBC count


Continue Zosyn 3.375mg IVPB Q6H


follow up Urine culture





4. MAREK


Most likely prerenal


Improved after IVF


Continue hydration 





5. C. diff colitis


Continuos to have diarrhea


Gives history recent diagnosis and treatment for C.Diff


Abiola positive for antigen not toxin


ID on consult


Will treat for C.diff colitis. On Flagyl for now








6.DM II with Hypoglycemia 


Po meds Invokana ,Metformin and Victosa on hold 


Diabetic diet


Novolog sliding scale according tomarthaeck


F/u HbA1c





7. Hypokalemia


Most likely secondary to GI loss


Replace with KCl runs





8. Obesity 








9. Generalized weakness


PT eval 


Will need KELECHI once medically stable


SW eval since patient does not want to go back to same place 





10. DVT Prophylaxis 


lovenox

## 2017-06-03 NOTE — CT
PROCEDURE:  CT Chest without contrast



HISTORY:

r/o pneumonia and pleural effusion



COMPARISON:

Chest radiograph from earlier on the same day



TECHNIQUE:

Contiguous axial images were obtained through the chest without 

intravenous contrast enhancement. Sagittal and coronal 

reconstructions were performed.







Radiation dose (DLP): 609.07 mGy-cm. 



This CT exam was performed using one or more of the following dose 

reduction techniques: Automated exposure control, adjustment of the 

mA and/or kV according to patient size, and/or use of iterative 

reconstruction technique.



FINDINGS:



LUNGS:

Patchy opacities in the lung bases. 



MEDIASTINUM:

Increased size of the heart. Possible stents in the coronary 

arteries. No significant pericardial effusion.



PLEURA:

Small bilateral pleural effusions.



BONES:

No fracture. No destructive lesion. T9 Vertebral body hemangioma. 



UPPER ABDOMEN:

Mild stranding surrounding the descending colon.  Underlying 

inflammatory changes cannot be excluded.



OTHER FINDINGS:

Right thyroid calcifications with heterogeneous parenchyma.



IMPRESSION:

Mild bilateral pleural effusions with associated compressive 

atelectasis and/ pneumonia.



Mild stranding surrounding the descending colon particularly in the 

vicinity of the splenic flexure.  Underlying inflammatory changes 

cannot be excluded.



Right thyroid heterogeneity. Dedicated ultrasound recommended.

## 2017-06-03 NOTE — CP.CCUPN
CCU Subjective





- Physician Review


Subjective (Free Text): 








***INTENSIVIST PROGRESS NOTE***


Patient examined, interim events reviewed, discussed with PMD:





No new complaints except for feeling thirsty, denies any new abdominal 

discomfort, still has loose diarrhea, rectal tube in place, no n/v, no fevers 

or chills. Improved mental status from yesterday with increase in glycemic 

levels. 





Other vitals:  Afebrile, Tmax 100.3F  /53,  HR 83, 100% SPO2 on NC. 


ROS: as above, no other pertinent negs or positives on 10 system review. 


PMFSH:  all nursing and historical notes reviewed, no new pertinent data 

relevant to current problems. 





No other distress noted: EXAM-


HEENT: no icterus, pupils equal and reactive


NECK: no visible JVD, supple, carotids equal upstroke bilat/no bruits


CHEST: decreased BS bases, no wheezes 


HEART:  regular, distant, S1S2, no murmur audible, no rubs.


ABD:  soft, no increased distention, no focal tenderness,  no HSM. BS hypoactive

, 


EXT:   no peripheral/ digital cyanosis, no calf tenderness or palpable cords, 

distal pulses intact and symmetrical


NEURO:   no gross focal motor deficits


SKIN:   no rashes





LABS: 


WBC= 10.0


HGB= 12.3


PLTs= 171K








Na= 139


K= 3.2


HCO3= 27


BUN/Cr= 29/1.4


BS= 173


CDiff Ag+





CXR:  poor inspiratory film, ? bi-apical haziness, significant left effusion.  (

my interp). 








MAJOR PROBLEMS NOW:


1.   Severe sepsis, r/o bacteremia, r/o AMI


2.   Urosepsis with GNR, awaiting identification 


3.   Azotemia / Dehydration, r/o ATN  MAREK


4.   Hypoglycemia with DM II


5.   Metabolic encephalopathy





PLAN:


1.   IVF hydration with supplemental fluids to match GI fluid losses with 

diarrhea. 


2.   On Zosyn / Flagyl, Vanco


3.    Stool for C Diff toxin


4.   Lactate levels until normalized.


5.   Hold or stop these NH meds:  Invokana, Tresiba, Victoza, Glucophage; Altace

, Coreg, Lasix, Gabapentin.


6.    Clarify any Advance Directives.

## 2017-06-04 LAB
BUN SERPL-MCNC: 13 MG/DL (ref 9–20)
CALCIUM SERPL-MCNC: 8.5 MG/DL (ref 8.4–10.2)
CHLORIDE SERPL-SCNC: 106 MMOL/L (ref 98–107)
CO2 SERPL-SCNC: 27 MMOL/L (ref 22–30)
ERYTHROCYTE [DISTWIDTH] IN BLOOD BY AUTOMATED COUNT: 16.7 % (ref 11.5–14.5)
GLUCOSE SERPL-MCNC: 171 MG/DL (ref 75–110)
HCT VFR BLD CALC: 36.4 % (ref 35–51)
MCH RBC QN AUTO: 26 PG (ref 27–31)
MCHC RBC AUTO-ENTMCNC: 32.2 G/DL (ref 33–37)
MCV RBC AUTO: 80.7 FL (ref 80–94)
PLATELET # BLD: 164 K/UL (ref 130–400)
POTASSIUM SERPL-SCNC: 3.3 MMOL/L (ref 3.6–5)
SODIUM SERPL-SCNC: 141 MMOL/L (ref 132–148)
WBC # BLD AUTO: 7.6 K/UL (ref 4.8–10.8)

## 2017-06-04 RX ADMIN — INSULIN LISPRO SCH UNIT: 100 INJECTION, SOLUTION INTRAVENOUS; SUBCUTANEOUS at 16:44

## 2017-06-04 RX ADMIN — WATER SCH MLS/HR: 1 INJECTION INTRAMUSCULAR; INTRAVENOUS; SUBCUTANEOUS at 04:05

## 2017-06-04 RX ADMIN — BACITRACIN SCH APPLIC: 500 OINTMENT TOPICAL at 09:35

## 2017-06-04 RX ADMIN — METRONIDAZOLE SCH MLS/HR: 500 INJECTION, SOLUTION INTRAVENOUS at 08:40

## 2017-06-04 RX ADMIN — INSULIN LISPRO SCH UNIT: 100 INJECTION, SOLUTION INTRAVENOUS; SUBCUTANEOUS at 12:07

## 2017-06-04 RX ADMIN — Medication SCH CAP: at 16:42

## 2017-06-04 RX ADMIN — METRONIDAZOLE SCH MLS/HR: 500 INJECTION, SOLUTION INTRAVENOUS at 16:42

## 2017-06-04 RX ADMIN — METRONIDAZOLE SCH MLS/HR: 500 INJECTION, SOLUTION INTRAVENOUS at 00:44

## 2017-06-04 RX ADMIN — WATER SCH MLS/HR: 1 INJECTION INTRAMUSCULAR; INTRAVENOUS; SUBCUTANEOUS at 09:35

## 2017-06-04 RX ADMIN — INSULIN LISPRO SCH: 100 INJECTION, SOLUTION INTRAVENOUS; SUBCUTANEOUS at 22:00

## 2017-06-04 RX ADMIN — ENOXAPARIN SODIUM SCH MG: 40 INJECTION SUBCUTANEOUS at 08:43

## 2017-06-04 RX ADMIN — INSULIN LISPRO SCH UNIT: 100 INJECTION, SOLUTION INTRAVENOUS; SUBCUTANEOUS at 08:45

## 2017-06-04 RX ADMIN — PANTOPRAZOLE SODIUM SCH MG: 40 TABLET, DELAYED RELEASE ORAL at 08:43

## 2017-06-04 NOTE — CP.PCM.PN
Subjective





- Date & Time of Evaluation


Date of Evaluation: 06/04/17


Time of Evaluation: 08:00





- Subjective


Subjective: 





seen and examined bedside. Awake , alert and oriented. Lying in bed in NAD.


less diarrhea


iv and po rx in progress





Objective





- Vital Signs/Intake and Output


Vital Signs (last 24 hours): 


 











Temp Pulse Resp BP Pulse Ox


 


 98 F   75   12   129/71   96 


 


 06/04/17 12:00  06/04/17 12:00  06/04/17 12:00  06/04/17 12:00  06/04/17 12:00








Intake and Output: 


 











 06/04/17 06/04/17





 06:59 18:59


 


Intake Total 665 300


 


Output Total 760 150


 


Balance -95 150














- Medications


Medications: 


 Current Medications





Acetaminophen (Tylenol 325mg Tab)  650 mg PO Q6 PRN


   PRN Reason: temp> 101 or mild pain 


Aspirin (Ecotrin)  81 mg PO DAILY [facility]


   Last Admin: 06/04/17 08:43 Dose:  81 mg


Atorvastatin Calcium (Lipitor)  10 mg PO HS [facility]


   Last Admin: 06/03/17 21:22 Dose:  10 mg


Bacitracin (Bacitracin Oint)  1 applic TOP DAILY [facility]


   Last Admin: 06/04/17 09:35 Dose:  1 applic


Enoxaparin Sodium (Lovenox)  40 mg SC DAILY [facility]


   PRN Reason: Protocol


   Last Admin: 06/04/17 08:43 Dose:  40 mg


Home Med (Cholecalciferol (Vitamin D3) [Vitamin D3])  1,000 unit PO DAILY [facility]


Piperacillin Sod/Tazobactam (Sod 3.375 gm/ Sodium Chloride)  100 mls @ 100 mls/

hr IVPB Q6 [facility]


   Last Admin: 06/04/17 09:35 Dose:  100 mls/hr


Metronidazole (Flagyl 500mg/100ml Ns)  100 mls @ 100 mls/hr IVPB Q8 [facility]


   Last Admin: 06/04/17 08:40 Dose:  100 mls/hr


Insulin Human Lispro (Humalog)  0 units SC ACHS [facility]


   PRN Reason: Protocol


   Last Admin: 06/04/17 12:07 Dose:  2 unit


Lactobacillus Acidophilus (Bacid Acidophilus)  1 cap PO BID [facility]


Pantoprazole Sodium (Protonix Ec Tab)  40 mg PO DAILY [facility]


   Last Admin: 06/04/17 08:43 Dose:  40 mg











- Labs


Labs: 


 





 06/04/17 05:30 





 06/04/17 05:30 





 











PT  11.4 SECONDS (9.6-11.2)  H  06/02/17  03:33    


 


INR  1.10  (0.92-1.08)  H  06/02/17  03:33    


 


APTT  27.0 SECONDS (23.3-32.5)   06/02/17  03:33    














- Constitutional


Appears: Non-toxic, Chronically Ill





- Head Exam


Head Exam: NORMOCEPHALIC





- Eye Exam


Eye Exam: PERRL.  absent: Scleral icterus





- ENT Exam


ENT Exam: Mucous Membranes Dry





- Neck Exam


Neck Exam: absent: Lymphadenopathy





- Respiratory Exam


Respiratory Exam: Decreased Breath Sounds, Rhonchi





- Cardiovascular Exam


Cardiovascular Exam: REGULAR RHYTHM, +S1, +S2





- GI/Abdominal Exam


GI & Abdominal Exam: Distended, Soft.  absent: Tenderness





- Rectal Exam


Rectal Exam: Deferred





- Extremities Exam


Extremities Exam: absent: Calf Tenderness, Pedal Edema





- Back Exam


Back Exam: absent: CVA tenderness (L), CVA tenderness (R)





- Neurological Exam


Neurological Exam: Alert, Awake, Oriented x3





- Psychiatric Exam


Psychiatric exam: Normal Mood





- Skin


Skin Exam: Dry





Assessment and Plan


(1) C. difficile colitis


Status: Acute   





(2) C. difficile colitis


Status: Acute   





(3) Hypoglycemia


Status: Acute   





(4) Severe sepsis


Status: Acute   





(5) UTI (urinary tract infection)


Status: Acute   





- Assessment and Plan (Free Text)


Assessment: 





cont rx

## 2017-06-04 NOTE — CP.PCM.PN
Subjective





- Date & Time of Evaluation


Date of Evaluation: 06/04/17


Time of Evaluation: 09:30





- Subjective


Subjective: 


Patient seen and examined bedside. Awake , alert and oriented. Lying in bed in 

NAD. Complains of pain to rectal area. 


Hemodynamically stable, afebrile. Denies any CP, SOB, abdominal discomfort 


No acute issues overnight


/58 HR 79 saturating 97 % on 2 L o2 via NC, afebrile


Rectal tube in place with watery diarrhea 


I/O 3685/2860


WBC 7 K Hgb 11  3.3








Objective





- Vital Signs/Intake and Output


Vital Signs (last 24 hours): 


 











Temp Pulse Resp BP Pulse Ox


 


 98.5 F   79   20   131/58 L  97 


 


 06/04/17 07:43  06/04/17 10:00  06/04/17 10:00  06/04/17 10:00  06/04/17 10:00








Intake and Output: 


 











 06/04/17 06/04/17





 06:59 18:59


 


Intake Total 665 200


 


Output Total 760 150


 


Balance -95 50














- Medications


Medications: 


 Current Medications





Acetaminophen (Tylenol 325mg Tab)  650 mg PO Q6 PRN


   PRN Reason: temp> 101 or mild pain 


Aspirin (Ecotrin)  [facility]


   Last Admin: 06/04/17 08:43 Dose:  81 mg


Atorvastatin Calcium (Lipitor)  10 mg PO HS [facility]


   Last Admin: 06/03/17 21:22 Dose:  10 mg


Bacitracin (Bacitracin Oint)  1 applic TOP [facility]ILY [facility]


   Last Admin: 06/04/17 09:35 Dose:  1 applic


Enoxaparin Sodium (Lovenox)  40 mg SC [facility]ILY [facility]


   PRN Reason: Protocol


   Last Admin: 06/04/17 08:43 Dose:  40 mg


Home Med (Cholecalciferol (Vitamin D3) [Vitamin D3])  1,000 unit PO [facility]ILY [facility]


Piperacillin Sod/Tazobactam (Sod 3.375 gm/ Sodium Chloride)  100 mls @ 100 mls/

hr IVPB Q6 [facility]


   Last Admin: 06/04/17 09:35 Dose:  100 mls/hr


Metronidazole (Flagyl 500mg/100ml Ns)  100 mls @ 100 mls/hr IVPB Q8 [facility]


   Last Admin: 06/04/17 08:40 Dose:  100 mls/hr


Insulin Human Lispro (Humalog)  0 units SC ACHS [facility]


   PRN Reason: Protocol


   Last Admin: 06/04/17 08:45 Dose:  1 unit


Pantoprazole Sodium (Protonix Ec Tab)  40 mg PO DAILY [facility]


   Last Admin: 06/04/17 08:43 Dose:  40 mg











- Labs


Labs: 


 





 06/04/17 05:30 





 06/04/17 05:30 





 











PT  11.4 SECONDS (9.6-11.2)  H  06/02/17  03:33    


 


INR  1.10  (0.92-1.08)  H  06/02/17  03:33    


 


APTT  27.0 SECONDS (23.3-32.5)   06/02/17  03:33    














- Constitutional


Appears: Non-toxic, No Acute Distress





- Head Exam


Head Exam: ATRAUMATIC, NORMAL INSPECTION, NORMOCEPHALIC





- Eye Exam


Eye Exam: EOMI, PERRL


Pupil Exam: NORMAL ACCOMODATION





- ENT Exam


ENT Exam: Mucous Membranes Moist, Normal Exam





- Neck Exam


Neck Exam: Full ROM, Normal Inspection





- Respiratory Exam


Respiratory Exam: Clear to Ausculation Bilateral, NORMAL BREATHING PATTERN.  

absent: Rales, Rhonchi, Wheezes





- Cardiovascular Exam


Cardiovascular Exam: REGULAR RHYTHM, RRR, +S1, +S2.  absent: JVD





- GI/Abdominal Exam


GI & Abdominal Exam: Soft, Normal Bowel Sounds.  absent: Distended, Guarding, 

Tenderness, Rebound





- Rectal Exam


Rectal Exam: Deferred





- Extremities Exam


Extremities Exam: Full ROM, Normal Capillary Refill, Normal Inspection


Additional comments: 





RLE chronic venous stasis changes





- Back Exam


Back Exam: NORMAL INSPECTION





- Neurological Exam


Neurological Exam: Alert, Awake, CN II-XII Intact, Oriented x3





- Psychiatric Exam


Psychiatric exam: Normal Affect





- Skin


Skin Exam: Dry, Warm





Assessment and Plan





- Assessment and Plan (Free Text)


Assessment: 


66 years old male with hx of DM II was  sent from the Nursing home/ [facility]  with 

Fever and Hypotension.As per patient he was treated recently for bacteremia, 

UTI and C.diff . Patient was found to be with AMS, hypotensive, febrile,

hypoglycemic with elevated WBC count, pyouria and diarrhea. He was admitted for 

severe sepsis and started on IVF and antibiotics. He responded well to IVF 

resuscitation.


At present in ICU , hemodynamically stable, afebrile with rectal tube in place 

and watery diarrhea


Stool positive for C.Diff Antigen 





1.Severe Sepsis 


improving 


Most likely source UTI , C.Diff colitis 


Presented with hypotension, hypoglycemia, febrile elevated WVBC count , 

diarrhea and pyuria


Responded well to fluid resuscitation


Stool positive for C.Diff antigen


ID consulted 


continue Flagyl and Zosyn IV


urine cx positive for E. coli


Blood culture with no growth so far 





2. Metabolic encephalopathy


improved 


At present AAOx3 


continue treatment for underlying infection 


 


3. UTI 


UA showed large WBC count


Urine cx positive for E.Coli


Continue Zosyn 3.375mg IVPB Q6H








4. MAREK


Most likely prerenal


Improved after IVF


Continue hydration 





5. C. diff colitis


Continuos to have diarrhea


Gives history recent diagnosis and treatment for C.Diff


Stool positive for antigen,not toxin


ID on consult


Will treat for C.diff colitis. On Flagyl for now


Start bacid 








6.DM II with Hypoglycemia 


Po meds Invokana ,Metformin and Victosa on hold 


Diabetic diet


Novolog sliding scale according to accuchecks


F/u HbA1c





7. Hypokalemia


Most likely secondary to GI loss


Replace with KCl runs





8. Obesity 








9. Generalized weakness


PT eval 


Will need KELECHI once medically stable


patient now agrees to go back to same NH he came from





10. DVT Prophylaxis 


lovenox

## 2017-06-05 LAB
BUN SERPL-MCNC: 9 MG/DL (ref 9–20)
CALCIUM SERPL-MCNC: 8.8 MG/DL (ref 8.4–10.2)
CHLORIDE SERPL-SCNC: 105 MMOL/L (ref 98–107)
CO2 SERPL-SCNC: 26 MMOL/L (ref 22–30)
ERYTHROCYTE [DISTWIDTH] IN BLOOD BY AUTOMATED COUNT: 16 % (ref 11.5–14.5)
GLUCOSE SERPL-MCNC: 177 MG/DL (ref 75–110)
HCT VFR BLD CALC: 36.3 % (ref 35–51)
MCH RBC QN AUTO: 26 PG (ref 27–31)
MCHC RBC AUTO-ENTMCNC: 32.4 G/DL (ref 33–37)
MCV RBC AUTO: 80.4 FL (ref 80–94)
PLATELET # BLD: 181 K/UL (ref 130–400)
POTASSIUM SERPL-SCNC: 3.7 MMOL/L (ref 3.6–5)
SODIUM SERPL-SCNC: 140 MMOL/L (ref 132–148)
WBC # BLD AUTO: 7.6 K/UL (ref 4.8–10.8)

## 2017-06-05 RX ADMIN — VANCOMYCIN HYDROCHLORIDE SCH GM: 1 INJECTION, POWDER, LYOPHILIZED, FOR SOLUTION INTRAVENOUS at 16:15

## 2017-06-05 RX ADMIN — ENOXAPARIN SODIUM SCH MG: 40 INJECTION SUBCUTANEOUS at 08:46

## 2017-06-05 RX ADMIN — Medication SCH CAP: at 16:19

## 2017-06-05 RX ADMIN — METRONIDAZOLE SCH MLS/HR: 500 INJECTION, SOLUTION INTRAVENOUS at 02:11

## 2017-06-05 RX ADMIN — PANTOPRAZOLE SODIUM SCH MG: 40 TABLET, DELAYED RELEASE ORAL at 08:46

## 2017-06-05 RX ADMIN — Medication SCH CAP: at 10:25

## 2017-06-05 RX ADMIN — INSULIN LISPRO SCH: 100 INJECTION, SOLUTION INTRAVENOUS; SUBCUTANEOUS at 07:29

## 2017-06-05 RX ADMIN — METRONIDAZOLE SCH MLS/HR: 500 INJECTION, SOLUTION INTRAVENOUS at 08:45

## 2017-06-05 RX ADMIN — INSULIN LISPRO SCH: 100 INJECTION, SOLUTION INTRAVENOUS; SUBCUTANEOUS at 21:42

## 2017-06-05 RX ADMIN — METRONIDAZOLE SCH MLS/HR: 500 INJECTION, SOLUTION INTRAVENOUS at 16:13

## 2017-06-05 RX ADMIN — INSULIN LISPRO SCH UNIT: 100 INJECTION, SOLUTION INTRAVENOUS; SUBCUTANEOUS at 11:51

## 2017-06-05 RX ADMIN — INSULIN LISPRO SCH UNIT: 100 INJECTION, SOLUTION INTRAVENOUS; SUBCUTANEOUS at 16:15

## 2017-06-05 RX ADMIN — BACITRACIN SCH APPLIC: 500 OINTMENT TOPICAL at 08:48

## 2017-06-05 NOTE — CP.PCM.PN
Subjective





- Date & Time of Evaluation


Date of Evaluation: 06/05/17


Time of Evaluation: 10:45





- Subjective


Subjective: 





No fever


persistent diarrhea  - approx 600ml overnight


denies abd pain


no CP


no SOB


no dysuria





Objective





- Vital Signs/Intake and Output


Vital Signs (last 24 hours): 


 











Temp Pulse Resp BP Pulse Ox


 


 97.5 F L  63   12   149/59 L  98 


 


 06/05/17 07:41  06/05/17 10:00  06/05/17 10:00  06/05/17 10:00  06/05/17 10:00








Intake and Output: 


 











 06/05/17 06/05/17





 06:59 18:59


 


Intake Total 340 120


 


Output Total 930 600


 


Balance -590 -480














- Medications


Medications: 


 Current Medications





Acetaminophen (Tylenol 325mg Tab)  650 mg PO Q6 PRN


   PRN Reason: temp> 101 or mild pain 


Aspirin (Ecotrin)  81 mg PO DAILY [facility]


   Last Admin: 06/05/17 08:46 Dose:  81 mg


Atorvastatin Calcium (Lipitor)  10 mg PO HS [facility]


   Last Admin: 06/04/17 21:32 Dose:  10 mg


Bacitracin (Bacitracin Oint)  1 applic TOP DAILY [facility]


   Last Admin: 06/05/17 08:48 Dose:  1 applic


Enoxaparin Sodium (Lovenox)  40 mg SC DAILY [facility]


   PRN Reason: Protocol


   Last Admin: 06/05/17 08:46 Dose:  40 mg


Home Med (Cholecalciferol (Vitamin D3) [Vitamin D3])  1,000 unit PO DAILY [facility]


Metronidazole (Flagyl 500mg/100ml Ns)  100 mls @ 100 mls/hr IVPB Q8 [facility]


   Last Admin: 06/05/17 08:45 Dose:  100 mls/hr


Ceftriaxone Sodium 1 gm/ (Sodium Chloride)  100 mls @ 100 mls/hr IVPB DAILY [facility]


   Last Admin: 06/05/17 10:26 Dose:  100 mls/hr


Insulin Human Lispro (Humalog)  0 units SC ACHS [facility]


   PRN Reason: Protocol


   Last Admin: 06/05/17 07:29 Dose:  Not Given


Lactobacillus Acidophilus (Bacid Acidophilus)  1 cap PO BID [facility]


   Last Admin: 06/05/17 10:25 Dose:  1 cap


Pantoprazole Sodium (Protonix Ec Tab)  40 mg PO DAILY [facility]


   Last Admin: 06/05/17 08:46 Dose:  40 mg











- Labs


Labs: 


 





 06/05/17 04:40 





 06/05/17 04:40 





 











PT  11.4 SECONDS (9.6-11.2)  H  06/02/17  03:33    


 


INR  1.10  (0.92-1.08)  H  06/02/17  03:33    


 


APTT  27.0 SECONDS (23.3-32.5)   06/02/17  03:33    














- Constitutional


Appears: No Acute Distress





- Head Exam


Head Exam: NORMAL INSPECTION, NORMOCEPHALIC





- Eye Exam


Eye Exam: EOMI, Normal appearance


Pupil Exam: NORMAL ACCOMODATION





- ENT Exam


ENT Exam: Mucous Membranes Moist, Normal External Ear Exam





- Neck Exam


Neck Exam: Full ROM.  absent: Meningismus





- Respiratory Exam


Respiratory Exam: Rhonchi, NORMAL BREATHING PATTERN.  absent: Wheezes, 

Respiratory Distress





- Cardiovascular Exam


Cardiovascular Exam: REGULAR RHYTHM, +S1, +S2





- GI/Abdominal Exam


GI & Abdominal Exam: Distended, Soft, Normal Bowel Sounds.  absent: Tenderness





- Extremities Exam


Extremities Exam: Full ROM, Normal Capillary Refill, Pedal Edema.  absent: Calf 

Tenderness


Additional comments: 





right big toe with avulsed nail and  necrotic ulcer 





- Back Exam


Back Exam: Full ROM.  absent: CVA tenderness (L), CVA tenderness (R)





- Neurological Exam


Neurological Exam: Alert, Awake, CN II-XII Intact, Oriented x3





- Psychiatric Exam


Psychiatric exam: Normal Affect, Normal Mood





- Skin


Skin Exam: Dry, Normal Color, Warm





Assessment and Plan





- Assessment and Plan (Free Text)


Assessment: 





66 years old male with hx of DM II was  sent from the Nursing home/ [facility]  with 

Fever and Hypotension.  As per patient he was treated recently for bacteremia, 

UTI and C.diff  Patient was found to be with AMS, hypotensive, febrile,

hypoglycemic with elevated WBC count, pyuria and diarrhea. He was admitted for 

severe Sepsis and started on IVF and antibiotics. He responded well to IVF 

resuscitation.


At present in ICU , hemodynamically stable, afebrile with rectal tube in place 

and with persitent watery diarrhea


Stool positive for C.Diff Antigen 





1.Severe Sepsis 


improving 


Most likely source UTI , C.Diff colitis 


Presented with hypotension, hypoglycemia, febrile elevated WVBC count , 

diarrhea and pyuria


Responded well to fluid resuscitation


Stool positive for C.Diff antigen


ID consulted 


continue Flagyl and Zosyn IV


urine cx positive for E. coli


Blood culture with no growth so far 


bec of persistence of diarrhea , will add PO Vanco





2. Metabolic encephalopathy


improved 


At present AAOx3 


continue treatment for underlying infection 


 


3. UTI 


UA showed large WBC count


Urine cx positive for E.Coli


Continue Zosyn 3.375mg IVPB Q6H








4. MAREK


Most likely prerenal


Improved after IVF


Continue hydration 





5. C. diff colitis


Continues to have diarrhea


Gives history recent diagnosis and treatment for C.Diff


Stool positive for antigen,not toxin


ID on consult


Will treat for C.diff colitis. On Flagyl for now


Start bacid 


Add PO vanco








6.DM II with Hypoglycemia 


Po meds Invokana ,Metformin and Victosa on hold 


Diabetic diet


Novolog sliding scale according to accuchecks


F/u HbA1c


start low dose Levemir





7. Hypokalemia


Most likely secondary to GI loss


Replace with KCl runs





8. Obesity  BMI 37.3








9. Generalized weakness


PT eval 


Will need KELECHI - SW consulted








10. Right Great Toe Ulcer


- Podiatry consult





11. DVT Prophylaxis 


lovenox

## 2017-06-05 NOTE — CP.PCM.PN
Subjective





- Date & Time of Evaluation


Date of Evaluation: 06/05/17


Time of Evaluation: 08:00





- Subjective


Subjective: 





less diarrhea


no fever








Objective





- Vital Signs/Intake and Output


Vital Signs (last 24 hours): 


 











Temp Pulse Resp BP Pulse Ox


 


 97.5 F L  63   12   149/59 L  98 


 


 06/05/17 07:41  06/05/17 10:00  06/05/17 10:00  06/05/17 10:00  06/05/17 10:00








Intake and Output: 


 











 06/05/17 06/05/17





 06:59 18:59


 


Intake Total 340 120


 


Output Total 930 600


 


Balance -590 -480














- Medications


Medications: 


 Current Medications





Acetaminophen (Tylenol 325mg Tab)  650 mg PO Q6 PRN


   PRN Reason: temp> 101 or mild pain 


Aspirin (Ecotrin)  81 mg PO DAILY [facility]


   Last Admin: 06/05/17 08:46 Dose:  81 mg


Atorvastatin Calcium (Lipitor)  10 mg PO HS [facility]


   Last Admin: 06/04/17 21:32 Dose:  10 mg


Bacitracin (Bacitracin Oint)  1 applic TOP DAILY [facility]


   Last Admin: 06/05/17 08:48 Dose:  1 applic


Enoxaparin Sodium (Lovenox)  40 mg SC DAILY [facility]


   PRN Reason: Protocol


   Last Admin: 06/05/17 08:46 Dose:  40 mg


Home Med (Cholecalciferol (Vitamin D3) [Vitamin D3])  1,000 unit PO DAILY [facility]


Metronidazole (Flagyl 500mg/100ml Ns)  100 mls @ 100 mls/hr IVPB Q8 [facility]


   Last Admin: 06/05/17 08:45 Dose:  100 mls/hr


Ceftriaxone Sodium 1 gm/ (Sodium Chloride)  100 mls @ 100 mls/hr IVPB DAILY [facility]


   Last Admin: 06/05/17 10:26 Dose:  100 mls/hr


Insulin Human Lispro (Humalog)  0 units SC ACHS [facility]


   PRN Reason: Protocol


   Last Admin: 06/05/17 07:29 Dose:  Not Given


Lactobacillus Acidophilus (Bacid Acidophilus)  1 cap PO BID [facility]


   Last Admin: 06/05/17 10:25 Dose:  1 cap


Pantoprazole Sodium (Protonix Ec Tab)  40 mg PO DAILY [facility]


   Last Admin: 06/05/17 08:46 Dose:  40 mg











- Labs


Labs: 


 





 06/05/17 04:40 





 06/05/17 04:40 





 











PT  11.4 SECONDS (9.6-11.2)  H  06/02/17  03:33    


 


INR  1.10  (0.92-1.08)  H  06/02/17  03:33    


 


APTT  27.0 SECONDS (23.3-32.5)   06/02/17  03:33    














- Constitutional


Appears: Non-toxic, Chronically Ill





- Head Exam


Head Exam: NORMOCEPHALIC





- Eye Exam


Eye Exam: PERRL.  absent: Scleral icterus





- ENT Exam


ENT Exam: Mucous Membranes Dry, Normal External Ear Exam





- Neck Exam


Neck Exam: absent: Lymphadenopathy





- Respiratory Exam


Respiratory Exam: Decreased Breath Sounds





- Cardiovascular Exam


Cardiovascular Exam: REGULAR RHYTHM





- GI/Abdominal Exam


GI & Abdominal Exam: Distended





Assessment and Plan


(1) C. difficile colitis


Status: Acute   





(2) C. difficile colitis


Status: Acute   





(3) Hypoglycemia


Status: Acute   





(4) Severe sepsis


Status: Acute   





(5) UTI (urinary tract infection)


Status: Acute

## 2017-06-05 NOTE — CP.PCM.CON
History of Present Illness





- History of Present Illness


History of Present Illness: 





65 y/o male seen at bedside with pmhx DM2, probably CHF, after podiatry 

consultation. Patient appears in NAD and AAOx3. Patient states that he had a 

fungal big toenail on his right foot that fell off when it got caught on his 

sock 1 month ago. Patient states that he banged his toe on a stove after the 

nail fell off and it started bleeding. Patient denies any pain to the area. He 

denies taking any pain medication at this time. Patient denies n/f/v/d/c/sob. 





pmhx: DM2, CHF?


Pshx: right 2nd digit partial amputation, L foot bunionectomy


SH: living in a nursing home


FH: unknown


all: NKDA 





Review of Systems





- Constitutional


Constitutional: As Per HPI





Past Patient History





- Past Medical History & Family History


Past Medical History?: Yes





- Past Social History


Smoking Status: Former Smoker





- CARDIAC


Hx Hypertension: Yes





- PULMONARY


Hx Respiratory Disorders: Yes


Hx Pneumonia: Yes





- NEUROLOGICAL


Hx Neurological Disorder: No





- HEENT


Hx HEENT Problems: No





- RENAL


Other/Comment: ESRD





- ENDOCRINE/METABOLIC


Hx Endocrine Disorders: Yes


Hx Diabetes Mellitus Type 2: Yes





- HEMATOLOGICAL/ONCOLOGICAL


Hx Blood Disorders: No





- INTEGUMENTARY


Other/Comment: Hx Decubitus.  Hx perineal/buttocks excoriation





- MUSCULOSKELETAL/RHEUMATOLOGICAL


Hx Musculoskeletal Disorders: No


Hx Falls: No





- GASTROINTESTINAL


Hx Gastrointestinal Disorders: Yes


Other/Comment: c diff





- GENITOURINARY/GYNECOLOGICAL


Hx Genitourinary Disorders: No





- PSYCHIATRIC


Hx Psychophysiologic Disorder: No


Hx Substance Use: No





- SURGICAL HISTORY


Hx Surgeries: Yes


Other/Comment: Righe second toe partial amputation





- ANESTHESIA


Hx Anesthesia: Yes


Hx Anesthesia Reactions: No





Meds


Allergies/Adverse Reactions: 


 Allergies











Allergy/AdvReac Type Severity Reaction Status Date / Time


 


No Known Allergies Allergy   Verified 06/02/17 02:23














- Medications


Medications: 


 Current Medications





Acetaminophen (Tylenol 325mg Tab)  650 mg PO Q6 PRN


   PRN Reason: temp> 101 or mild pain 


Aspirin (Ecotrin)  81 mg PO DAILY [facility]


   Last Admin: 06/05/17 08:46 Dose:  81 mg


Atorvastatin Calcium (Lipitor)  10 mg PO HS [facility]


   Last Admin: 06/04/17 21:32 Dose:  10 mg


Bacitracin (Bacitracin Oint)  1 applic TOP DAILY [facility]


   Last Admin: 06/05/17 08:48 Dose:  1 applic


Enoxaparin Sodium (Lovenox)  40 mg SC DAILY [facility]


   PRN Reason: Protocol


   Last Admin: 06/05/17 08:46 Dose:  40 mg


Home Med (Cholecalciferol (Vitamin D3) [Vitamin D3])  1,000 unit PO DAILY [facility]


Metronidazole (Flagyl 500mg/100ml Ns)  100 mls @ 100 mls/hr IVPB Q8 [facility]


   Last Admin: 06/05/17 08:45 Dose:  100 mls/hr


Ceftriaxone Sodium 1 gm/ (Sodium Chloride)  100 mls @ 100 mls/hr IVPB DAILY [facility]


   Last Admin: 06/05/17 10:26 Dose:  100 mls/hr


Insulin Human Lispro (Humalog)  0 units SC ACHS [facility]


   PRN Reason: Protocol


   Last Admin: 06/05/17 11:51 Dose:  2 unit


Lactobacillus Acidophilus (Bacid Acidophilus)  1 cap PO BID [facility]


   Last Admin: 06/05/17 10:25 Dose:  1 cap


Pantoprazole Sodium (Protonix Ec Tab)  40 mg PO DAILY [facility]


   Last Admin: 06/05/17 08:46 Dose:  40 mg


Vancomycin HCl (Vancomycin Inj)  0.25 gm PO Q8 [facility]











Physical Exam





- Constitutional


Appears: Well, Non-toxic, No Acute Distress





- Extremities Exam


Additional comments: 





Vasc: DP/PT 2/4 b/l, TG wnl, CFT < 3 sec to all digits


neuro: grossly diminished


derm: hyperpigmented R hallucal nail bed, dried blood, no active bleeding, no 

edema, no erythema, no purulence, no drainage, no malodor, no acute clinical 

signs of infection


ortho: no pain on palpation of R hallucal nail





- Neurological Exam


Neurological exam: Alert, Oriented x3





- Psychiatric Exam


Psychiatric exam: Normal Affect, Normal Mood





Results





- Vital Signs


Recent Vital Signs: 


 Last Vital Signs











Temp  97.6 F   06/05/17 12:00


 


Pulse  81   06/05/17 14:00


 


Resp  20   06/05/17 14:00


 


BP  141/53 L  06/05/17 14:00


 


Pulse Ox  100   06/05/17 14:00














- Labs


Result Diagrams: 


 06/05/17 04:40





 06/05/17 04:40


Labs: 


 Laboratory Results - last 24 hr











  06/03/17 06/04/17 06/04/17





  05:30 16:09 21:12


 


WBC   


 


RBC   


 


Hgb   


 


Hct   


 


MCV   


 


MCH   


 


MCHC   


 


RDW   


 


Plt Count   


 


Sodium   


 


Potassium   


 


Chloride   


 


Carbon Dioxide   


 


Anion Gap   


 


BUN   


 


Creatinine   


 


Est GFR ( Amer)   


 


Est GFR (Non-Af Amer)   


 


POC Glucose (mg/dL)   164 H  188 H


 


Random Glucose   


 


Hemoglobin A1c  6.5  


 


Calcium   














  06/05/17 06/05/17 06/05/17





  04:40 04:40 05:52


 


WBC  7.6  


 


RBC  4.52  


 


Hgb  11.8 L  


 


Hct  36.3  


 


MCV  80.4  


 


MCH  26.0 L  


 


MCHC  32.4 L  


 


RDW  16.0 H  


 


Plt Count  181  


 


Sodium   140 


 


Potassium   3.7 


 


Chloride   105 


 


Carbon Dioxide   26 


 


Anion Gap   13 


 


BUN   9 


 


Creatinine   0.6 L 


 


Est GFR ( Amer)   > 60 


 


Est GFR (Non-Af Amer)   > 60 


 


POC Glucose (mg/dL)    143 H


 


Random Glucose   177 H 


 


Hemoglobin A1c   


 


Calcium   8.8 














  06/05/17





  10:54


 


WBC 


 


RBC 


 


Hgb 


 


Hct 


 


MCV 


 


MCH 


 


MCHC 


 


RDW 


 


Plt Count 


 


Sodium 


 


Potassium 


 


Chloride 


 


Carbon Dioxide 


 


Anion Gap 


 


BUN 


 


Creatinine 


 


Est GFR ( Amer) 


 


Est GFR (Non-Af Amer) 


 


POC Glucose (mg/dL)  218 H


 


Random Glucose 


 


Hemoglobin A1c 


 


Calcium 














Assessment & Plan





- Assessment and Plan (Free Text)


Assessment: 





65 y/o male with pmhx DM2 seen at bedside for R hallucal nail discoloration


Plan: 





patient evaluated and chart reviewed


discussed in detail with attending Dr. Lou


labs and vitals reviewed


bactroban, DSD to R oswaldux


patient to keep dressing c/d/i


podiatry will continue to follow while patient remains in house

## 2017-06-05 NOTE — PN
DATE: 06/05/2017



LOCATION:  The patient in ICU, bed 429.



TIME SPENT:  35 minutes.



The patient is seen and examined at the bedside.  Awake, alert, oriented to name, place and time.  No
 distress noted.  Complaining of pain to rectal area.  Rectal tube in place.  Noted to have watery di
arrhea.  Denies shortness of breath, chest pain, palpitation.  No swelling of the lower extremities.



PHYSICAL EXAMINATION:

VITAL SIGNS:  Temperature 97.8, heart rate 62 regular, respiratory rate 21, thoracoabdominal.  Blood 
pressure 145/65, pulse oximetry 95% on oxygen 2 liters nasal cannula.  Intake 1340, output 1780, nega
tive 440.

HEAD, EYE, EAR, NOSE, THROAT:  Pupils reactive.  Conjunctivae pink.  Sclerae white.

NECK:  Supple.

CHEST:  Bilateral breath sounds.  Clear to auscultation.

HEART:  Rhythm regular.  S1, S2 normal.

ABDOMEN:  Bowel sounds present, soft.

EXTREMITIES:  Normal capillary refill.  Chronic venous stasis changes, right lower extremity.  Mild e
rythema with edema on the left lower extremity.

NEUROLOGIC:  Unremarkable.



LABORATORY DATA:  WBC 7.6, hemoglobin 11.8, hematocrit 36.3, platelet count 181.  Coags:  PT 11.4, IN
R 1.10, PTT 27.  SMA-7:  Sodium 140, potassium 3.7, chloride 105, CO2 of 26, blood urea nitrogen 13, 
creatinine 0.6, random glucose 218.  Urinalysis:  WBC many, microscopic WBC 4111.



IMPRESSION:  A 66-year-old male with a history of diabetes mellitus type 2, admitted from [facility]
e/subacute rehab for the fever, hypotension.  Noted to have urinary tract infection and recent Clostr
idium difficile colitis.  Admitted with severe sepsis, started on intravenous fluid and antibiotics. 
 Responded well to intravenous fluid resuscitation.  Sepsis improved, likely source urinary tract inf
ection and/or Clostridium difficile colitis.  Currently on Zosyn and Flagyl.  Continue antibiotic as 
per infectious disease consult.  Metabolic encephalopathy improved.  Urinary tract infection, culture
 positive for Escherichia coli, on Zosyn 3.375 grams intravenous q.6 hours.  Acute kidney injury impr
alanna after intravenous hydration.





__________________________________________

Dante Kramer MD







cc:



DD: 06/05/2017 13:01:26  170

TT: 06/05/2017 13:21:38

Confirmation # [id number]

Dictation # [id number]

sn

## 2017-06-06 VITALS — OXYGEN SATURATION: 97 %

## 2017-06-06 VITALS — HEART RATE: 63 BPM | DIASTOLIC BLOOD PRESSURE: 73 MMHG | SYSTOLIC BLOOD PRESSURE: 146 MMHG

## 2017-06-06 VITALS — RESPIRATION RATE: 18 BRPM

## 2017-06-06 VITALS — TEMPERATURE: 97.8 F

## 2017-06-06 LAB
BACTERIA #/AREA URNS HPF: (no result) /[HPF]
BILIRUB UR-MCNC: NEGATIVE MG/DL
COLOR UR: YELLOW
GLUCOSE UR STRIP-MCNC: >=500 MG/DL
KETONES UR STRIP-MCNC: 20 MG/DL
LEUKOCYTE ESTERASE UR-ACNC: (no result) LEU/UL
PH UR STRIP: 6 [PH] (ref 5–8)
PROT UR STRIP-MCNC: NEGATIVE MG/DL
RBC # UR STRIP: NEGATIVE /UL
RBC #/AREA URNS HPF: 1 /HPF (ref 0–3)
SP GR UR STRIP: 1.02 (ref 1–1.03)
UROBILINOGEN UR-MCNC: (no result) MG/DL (ref 0.2–1)
WBC #/AREA URNS HPF: 20 /HPF (ref 0–5)

## 2017-06-06 RX ADMIN — METRONIDAZOLE SCH MLS/HR: 500 INJECTION, SOLUTION INTRAVENOUS at 09:41

## 2017-06-06 RX ADMIN — PANTOPRAZOLE SODIUM SCH MG: 40 TABLET, DELAYED RELEASE ORAL at 09:40

## 2017-06-06 RX ADMIN — Medication SCH CAP: at 16:54

## 2017-06-06 RX ADMIN — METRONIDAZOLE SCH MLS/HR: 500 INJECTION, SOLUTION INTRAVENOUS at 16:56

## 2017-06-06 RX ADMIN — VANCOMYCIN HYDROCHLORIDE SCH GM: 1 INJECTION, POWDER, LYOPHILIZED, FOR SOLUTION INTRAVENOUS at 00:09

## 2017-06-06 RX ADMIN — INSULIN LISPRO SCH UNIT: 100 INJECTION, SOLUTION INTRAVENOUS; SUBCUTANEOUS at 12:11

## 2017-06-06 RX ADMIN — Medication SCH CAP: at 09:41

## 2017-06-06 RX ADMIN — VANCOMYCIN HYDROCHLORIDE SCH GM: 1 INJECTION, POWDER, LYOPHILIZED, FOR SOLUTION INTRAVENOUS at 09:43

## 2017-06-06 RX ADMIN — INSULIN LISPRO SCH UNIT: 100 INJECTION, SOLUTION INTRAVENOUS; SUBCUTANEOUS at 16:56

## 2017-06-06 RX ADMIN — METRONIDAZOLE SCH MLS/HR: 500 INJECTION, SOLUTION INTRAVENOUS at 00:09

## 2017-06-06 RX ADMIN — ENOXAPARIN SODIUM SCH MG: 40 INJECTION SUBCUTANEOUS at 09:41

## 2017-06-06 RX ADMIN — BACITRACIN SCH APPLIC: 500 OINTMENT TOPICAL at 09:40

## 2017-06-06 RX ADMIN — INSULIN LISPRO SCH UNIT: 100 INJECTION, SOLUTION INTRAVENOUS; SUBCUTANEOUS at 06:45

## 2017-06-06 NOTE — CP.PCM.DIS
Provider





- Provider


Date of Admission: 


06/02/17 04:47





Attending physician: 


Sunil Rice





Primary care physician: 





Dr ANGLE Steele


Consults: 





ID : Dr Crum


Podiatry : Dr Lou





Time Spent in preparation of Discharge (in minutes): 35





Diagnosis





- Discharge Diagnosis


(1) Severe sepsis


Status: Acute   





(2) UTI (urinary tract infection)


Status: Acute   





(3) C. difficile colitis


Status: Acute   





(4) Hypoglycemia


Status: Acute   





(5) HTN (hypertension)


Status: Chronic   





(6) DM type 2 (diabetes mellitus, type 2)


Status: Chronic   





(7) MAREK (acute kidney injury)


Status: Acute   





(8) Metabolic encephalopathy


Status: Acute   





(9) Discoloration of nail


Status: Chronic   





(10) Obesity (BMI 30-39.9)


Status: Chronic   





Hospital Course





- Lab Results


Lab Results: 


 Micro Results





06/02/17 12:30   Naris   MRSA Culture (Admit) - Final


                            MRSA NOT DETECTED





 Most Recent Lab Values











WBC  7.6 K/uL (4.8-10.8)   06/05/17  04:40    


 


RBC  4.52 Mil/uL (4.40-5.90)   06/05/17  04:40    


 


Hgb  11.8 g/dL (12.0-18.0)  L  06/05/17  04:40    


 


Hct  36.3 % (35.0-51.0)   06/05/17  04:40    


 


MCV  80.4 fl (80.0-94.0)   06/05/17  04:40    


 


MCH  26.0 pg (27.0-31.0)  L  06/05/17  04:40    


 


MCHC  32.4 g/dL (33.0-37.0)  L  06/05/17  04:40    


 


RDW  16.0 % (11.5-14.5)  H  06/05/17  04:40    


 


Plt Count  181 K/uL (130-400)   06/05/17  04:40    


 


MPV  8.5 fl (7.2-11.7)   06/03/17  05:30    


 


Neut % (Auto)  77.5 % (50.0-75.0)  H  06/03/17  05:30    


 


Lymph % (Auto)  8.3 % (20.0-40.0)  L  06/03/17  05:30    


 


Mono % (Auto)  8.5 % (0.0-10.0)   06/03/17  05:30    


 


Eos % (Auto)  5.4 % (0.0-4.0)  H  06/03/17  05:30    


 


Baso % (Auto)  0.3 % (0.0-2.0)   06/03/17  05:30    


 


Neut #  7.7 K/uL (1.8-7.0)  H  06/03/17  05:30    


 


Lymph #  0.8 K/uL (1.0-4.3)  L  06/03/17  05:30    


 


Mono #  0.9 K/uL (0.0-0.8)  H  06/03/17  05:30    


 


Eos #  0.5 K/uL (0.0-0.7)   06/03/17  05:30    


 


Baso #  0.0 K/uL (0.0-0.2)   06/03/17  05:30    


 


Neutrophils % (Manual)  79 % (42-75)  H  06/03/17  05:30    


 


Band Neutrophils %  2 % (0-2)   06/03/17  05:30    


 


Lymphocytes % (Manual)  9 % (20-50)  L  06/03/17  05:30    


 


Monocytes % (Manual)  6 % (0-10)   06/03/17  05:30    


 


Eosinophils % (Manual)  4 % (0-7)   06/03/17  05:30    


 


Platelet Estimate  Normal  (NORMAL)   06/03/17  05:30    


 


Large Platelets  Present   06/03/17  05:30    


 


Poikilocytosis (manual  Slight   06/03/17  05:30    


 


Anisocytosis (manual)  Slight   06/03/17  05:30    


 


Tear Drop Cells  Slight   06/03/17  05:30    


 


Ovalocytes  Slight   06/03/17  05:30    


 


PT  11.4 SECONDS (9.6-11.2)  H  06/02/17  03:33    


 


INR  1.10  (0.92-1.08)  H  06/02/17  03:33    


 


APTT  27.0 SECONDS (23.3-32.5)   06/02/17  03:33    


 


pCO2  47 mm/Hg (35-45)  H  06/02/17  03:26    


 


pO2  102 mm/Hg ()  H  06/02/17  03:26    


 


HCO3  26.0 mmol/L (21-28)   06/02/17  03:26    


 


ABG pH  7.37  (7.35-7.45)   06/02/17  03:26    


 


ABG Total CO2  28.6 mmol/L (22-28)  H  06/02/17  03:26    


 


ABG O2 Saturation  99.3 % (95-98)  H  06/02/17  03:26    


 


ABG Base Excess  1.3 mmol/L (-2.0-3.0)   06/02/17  03:26    


 


Edwar Test  Yes   06/02/17  03:26    


 


ABG Potassium  3.1 mmol/L (3.6-5.2)  L  06/02/17  03:26    


 


A-a O2 Difference  124.0 mm/Hg  06/02/17  03:26    


 


Sodium  135.0 mmol/L (132-148)   06/02/17  03:26    


 


Chloride  104.0 mmol/L ()   06/02/17  03:26    


 


Glucose  38 mg/dL ()  L*  06/02/17  03:26    


 


Lactate  0.8 mmol/L (0.7-2.1)   06/02/17  03:26    


 


Vent Mode  5l nc   06/02/17  03:26    


 


FiO2  40.0 %  06/02/17  03:26    


 


Blood Gas Comments  5l nc   06/02/17  03:26    


 


Crit Value Called To  Khushi jay md   06/02/17  03:26    


 


Crit Value Called By  333   06/02/17  03:26    


 


Crit Value Read Back  Y   06/02/17  03:26    


 


Blood Gas Notified Time  335   06/02/17  03:26    


 


Sodium  140 mmol/l (132-148)   06/05/17  04:40    


 


Potassium  3.7 MMOL/L (3.6-5.0)   06/05/17  04:40    


 


Chloride  105 mmol/L ()   06/05/17  04:40    


 


Carbon Dioxide  26 mmol/L (22-30)   06/05/17  04:40    


 


Anion Gap  13  (10-20)   06/05/17  04:40    


 


BUN  9 mg/dl (9-20)   06/05/17  04:40    


 


Creatinine  0.6 mg/dL (0.8-1.5)  L  06/05/17  04:40    


 


Est GFR ( Amer)  > 60   06/05/17  04:40    


 


Est GFR (Non-Af Amer)  > 60   06/05/17  04:40    


 


POC Glucose (mg/dL)  248 mg/dL ()  H  06/06/17  11:22    


 


Random Glucose  177 mg/dL ()  H  06/05/17  04:40    


 


Hemoglobin A1c  6.5 % (4.2-6.5)   06/03/17  05:30    


 


Lactic Acid  0.8 MMOL/L (0.7-2.1)   06/03/17  05:30    


 


Calcium  8.8 mg/dL (8.4-10.2)   06/05/17  04:40    


 


Phosphorus  4.1 mg/dl (2.5-4.5)   06/02/17  03:33    


 


Magnesium  1.6 MG/DL (1.6-2.3)   06/02/17  03:33    


 


Total Bilirubin  0.6 mg/dl (0.2-1.3)   06/03/17  05:30    


 


AST  17 U/L (17-59)  D 06/03/17  05:30    


 


ALT  22 U/L (21-72)   06/03/17  05:30    


 


Alkaline Phosphatase  75 U/L ()   06/03/17  05:30    


 


Total Protein  6.2 G/DL (6.3-8.2)  L  06/03/17  05:30    


 


Albumin  3.1 g/dL (3.5-5.0)  L  06/03/17  05:30    


 


Globulin  3.0 gm/dL (2.2-3.9)   06/03/17  05:30    


 


Albumin/Globulin Ratio  1.0  (1.0-2.1)   06/03/17  05:30    


 


Arterial Blood Potassium  3.1 mmol/L (3.6-5.2)  L  06/02/17  03:26    


 


Urine Color  Yellow  (YELLOW)   06/06/17  10:50    


 


Urine Clarity  Clear  (Clear)   06/06/17  10:50    


 


Urine pH  6.0  (5.0-8.0)   06/06/17  10:50    


 


Ur Specific Gravity  1.017  (1.003-1.030)   06/06/17  10:50    


 


Urine Protein  Negative mg/dL (NEGATIVE)   06/06/17  10:50    


 


Urine Glucose (UA)  >=500 mg/dL (Normal)   06/06/17  10:50    


 


Urine Ketones  20 mg/dL (NEGATIVE)   06/06/17  10:50    


 


Urine Blood  Negative  (NEGATIVE)   06/06/17  10:50    


 


Urine Nitrate  Negative  (NEGATIVE)   06/06/17  10:50    


 


Urine Bilirubin  Negative  (NEGATIVE)   06/06/17  10:50    


 


Urine Urobilinogen  0.2-1.0 mg/dL (0.2-1.0)   06/06/17  10:50    


 


Ur Leukocyte Esterase  Trace Sharath/uL (Negative)   06/06/17  10:50    


 


Urine RBC (Auto)  1 /hpf (0-3)   06/06/17  10:50    


 


Urine WBC Clumps (Auto)  Many /hpf (NONE)  H  06/02/17  03:33    


 


Urine Microscopic WBC  20 /hpf (0-5)  H  06/06/17  10:50    


 


Ur Squamous Epith Cells  1 /hpf (0-5)   06/06/17  10:50    


 


Urine Bacteria  Rare  (<OCC)   06/06/17  10:50    


 


Hyaline Casts  0-2 /hpf (0-2)   06/02/17  03:33    


 


C. difficile Ag & Toxin  Positive antigen  (NEGATIVE)   06/02/17  05:16    














- Hospital Course


Hospital Course: 





66 years old male with hx of DM II was  sent from the Nursing home/ [facility]  with 

Fever and Hypotension.  As per patient he was treated recently for bacteremia, 

UTI and C.diff  Patient was found to be with AMS, hypotensive, febrile,

hypoglycemic with elevated WBC count, pyuria and diarrhea. He was admitted for 

severe Sepsis and started on IVF and antibiotics. He responded well to IVF 

resuscitation.   Found to be C diff Antigen +, started on PO Vanco .  ID 

consulted


At present , hemodynamically stable, afebrile, leukocytosis resolved and 

diarrhea improved.  Tolerating PO diet








1.Severe Sepsis 


improving 


Most likely source UTI , C.Diff colitis 


Presented with hypotension, hypoglycemia, febrile elevated WVBC count , 

diarrhea and pyuria


Responded well to fluid resuscitation


Stool positive for C.Diff antigen


ID consulted 


started on IV  Zosyn  then changed to Ceftriaxone IV - will cont for 5 more days


urine cx positive for E. coli sensitive to most abx


Blood culture with no growth so far 


also started on PO Vanco - continue for 10 more days








2. Metabolic encephalopathy


improved 


At present AAOx3 


continue treatment for underlying infection 


 


3. UTI 


UA showed large WBC count


Urine cx positive for E.Coli


started on IV Zosyn then descalated to IV Ceftriaxone








4. MAREK


Most likely prerenal


Improved after IVF








5. C. diff colitis


diarrhea better


Gives history recent diagnosis and treatment for C.Diff


Stool positive for antigen,not toxin


ID on consult


started on PO Flagyl and PO Vanco








6.DM II with Hypoglycemia 


Po meds Invokana ,Metformin and Victosa on hold as pt came in with hypoglycemia


Diabetic diet


Novolog sliding scale according to accuchecks


may resume DM meds now that pt no longer hypoglycemic , diarrhea improved and 

pt tolerating PO diet





7. Hypokalemia


Most likely secondary to GI loss


Replace with KCl runs





8. Obesity  BMI 37.3








9. Generalized weakness


PT eval 


Will need KELECHI - SW consulted








10. Right Great Toe Ulcer


- Podiatry consulted


apply Bacitracin





11. DVT Prophylaxis 


lovenox








Discharge Exam





- Head Exam


Head Exam: NORMAL INSPECTION, NORMOCEPHALIC





- Eye Exam


Eye Exam: EOMI, Normal appearance


Pupil Exam: NORMAL ACCOMODATION





- ENT Exam


ENT Exam: Mucous Membranes Moist, Normal External Ear Exam





- Neck Exam


Neck exam: Full Rom





- Respiratory Exam


Respiratory Exam: NORMAL BREATHING PATTERN.  absent: Respiratory Distress





- Cardiovascular Exam


Cardiovascular Exam: REGULAR RHYTHM, +S1, +S2





- GI/Abdominal Exam


GI & Abdominal Exam: Normal Bowel Sounds, Soft.  absent: Tenderness





- Extremities Exam


Extremities exam: normal capillary refill, pedal pulses present


Additional comments: 





right big toe with dressing\


stasis skin changes LE





- Back Exam


Back exam: FULL ROM.  absent: CVA tenderness (L), CVA tenderness (R), 

paraspinal tenderness





- Neurological Exam


Neurological exam: Alert, CN II-XII Intact, Oriented x3, Reflexes Normal





- Psychiatric Exam


Psychiatric exam: Normal Affect, Normal Mood





- Skin


Skin Exam: Dry, Normal Color, Warm





Discharge Plan





- Discharge Medications


Prescriptions: 


cefTRIAXone 1 gm [Rocephin 1 gram IVPB] 1 gm IVPB DAILY 5 Days


metroNIDAZOLE [Flagyl] 500 mg PO TID 10 Days


Vancomycin [Vancocin (ORAL OR RECTAL USE)] 250 mg PO QID 10 Days





- Follow Up Plan


Condition: GOOD


Disposition: TRANSF TO SNF


Additional Instructions: 


cont IV Ceftriaxone x 5 days 


cont PO Vanco and Flagyl x 10 days


ff up with Podiatry in 2 wks


ff up with Dr Steele in 1-2 wks





Referrals: 


Teofilo Steele [Staff Provider] -

## 2018-05-20 ENCOUNTER — HOSPITAL ENCOUNTER (EMERGENCY)
Dept: HOSPITAL 14 - H.ER | Age: 68
Discharge: HOME | End: 2018-05-20
Payer: MEDICARE

## 2018-05-20 VITALS — OXYGEN SATURATION: 96 % | DIASTOLIC BLOOD PRESSURE: 84 MMHG | HEART RATE: 85 BPM | SYSTOLIC BLOOD PRESSURE: 147 MMHG

## 2018-05-20 VITALS — TEMPERATURE: 98.2 F | RESPIRATION RATE: 16 BRPM

## 2018-05-20 DIAGNOSIS — E78.00: ICD-10-CM

## 2018-05-20 DIAGNOSIS — Z79.82: ICD-10-CM

## 2018-05-20 DIAGNOSIS — J32.2: ICD-10-CM

## 2018-05-20 DIAGNOSIS — E11.22: ICD-10-CM

## 2018-05-20 DIAGNOSIS — I12.0: ICD-10-CM

## 2018-05-20 DIAGNOSIS — N18.6: ICD-10-CM

## 2018-05-20 DIAGNOSIS — Z79.84: ICD-10-CM

## 2018-05-20 DIAGNOSIS — I25.10: ICD-10-CM

## 2018-05-20 DIAGNOSIS — N39.0: Primary | ICD-10-CM

## 2018-05-20 LAB
ANISOCYTOSIS BLD QL SMEAR: SLIGHT
BACTERIA #/AREA URNS HPF: (no result) /[HPF]
BASOPHILS # BLD AUTO: 0.1 K/UL (ref 0–0.2)
BASOPHILS NFR BLD: 0.8 % (ref 0–2)
BASOPHILS NFR BLD: 1 % (ref 0–2)
BILIRUB UR-MCNC: NEGATIVE MG/DL
BUN SERPL-MCNC: 16 MG/DL (ref 9–20)
CALCIUM SERPL-MCNC: 9.2 MG/DL (ref 8.4–10.2)
COLOR UR: YELLOW
EOSINOPHIL # BLD AUTO: 0.1 K/UL (ref 0–0.7)
EOSINOPHIL NFR BLD: 1.3 % (ref 0–4)
ERYTHROCYTE [DISTWIDTH] IN BLOOD BY AUTOMATED COUNT: 15.6 % (ref 11.5–14.5)
GFR NON-AFRICAN AMERICAN: > 60
GLUCOSE UR STRIP-MCNC: >=500 MG/DL
HGB BLD-MCNC: 13.8 G/DL (ref 12–18)
LEUKOCYTE ESTERASE UR-ACNC: (no result) LEU/UL
LYMPHOCYTE: 7 % (ref 20–50)
LYMPHOCYTES # BLD AUTO: 0.6 K/UL (ref 1–4.3)
LYMPHOCYTES NFR BLD AUTO: 5.8 % (ref 20–40)
MCH RBC QN AUTO: 26.1 PG (ref 27–31)
MCHC RBC AUTO-ENTMCNC: 33.2 G/DL (ref 33–37)
MCV RBC AUTO: 78.7 FL (ref 80–94)
MICROCYTES BLD QL SMEAR: SLIGHT
MONOCYTE: 3 % (ref 0–10)
MONOCYTES # BLD: 0.4 K/UL (ref 0–0.8)
MONOCYTES NFR BLD: 4.1 % (ref 0–10)
NEUTROPHILS # BLD: 9.4 K/UL (ref 1.8–7)
NEUTROPHILS NFR BLD AUTO: 85 % (ref 42–75)
NEUTROPHILS NFR BLD AUTO: 88 % (ref 50–75)
NEUTS BAND NFR BLD: 2 % (ref 0–2)
NRBC BLD AUTO-RTO: 0 % (ref 0–0)
OVALOCYTES BLD QL SMEAR: SLIGHT
PH UR STRIP: 7 [PH] (ref 5–8)
PLATELET # BLD EST: NORMAL 10*3/UL
PLATELET # BLD: 235 K/UL (ref 130–400)
PMV BLD AUTO: 7.7 FL (ref 7.2–11.7)
POIKILOCYTOSIS BLD QL SMEAR: SLIGHT
PROT UR STRIP-MCNC: NEGATIVE MG/DL
RBC # BLD AUTO: 5.29 MIL/UL (ref 4.4–5.9)
RBC # UR STRIP: (no result) /UL
SP GR UR STRIP: 1.01 (ref 1–1.03)
SQUAMOUS EPITHIAL: 3 /HPF (ref 0–5)
TOTAL CELLS COUNTED BLD: 100
URINE CLARITY: (no result)
UROBILINOGEN UR-MCNC: (no result) MG/DL (ref 0.2–1)
VARIANT LYMPHS NFR BLD MANUAL: 2 % (ref 0–0)
WBC # BLD AUTO: 10.7 K/UL (ref 4.8–10.8)

## 2018-05-20 NOTE — CT
PROCEDURE:  CT HEAD WITHOUT CONTRAST.



HISTORY:

dizziness



COMPARISON:

None available. 



TECHNIQUE:

Axial computed tomography images were obtained through the head/brain 

without intravenous contrast.  



Radiation dose:



Total exam DLP = 910.52 mGy-cm.



This CT exam was performed using one or more of the following dose 

reduction techniques: Automated exposure control, adjustment of the 

mA and/or kV according to patient size, and/or use of iterative 

reconstruction technique.



FINDINGS:



HEMORRHAGE:

No intracranial hemorrhage. 



BRAIN:

No mass effect or edema.  No atrophy or chronic microvascular 

ischemic changes.



VENTRICLES:

Unremarkable. No hydrocephalus. 



CALVARIUM:

Unremarkable.



PARANASAL SINUSES:

Complete opacification of left maxillary antrum with curvilinear 

central calcification, possibly reflecting allergic fungal sinusitis. 

Mild chronic ethmoid sinusitis.



MASTOID AIR CELLS:

Unremarkable as visualized. No inflammatory changes.



OTHER FINDINGS:

None.



IMPRESSION:

No intracranial mass, hemorrhage or evidence of acute infarct.  

Possible allergic fungal sinusitis of the left maxillary sinus.  Mild 

chronic ethmoid sinusitis. No additional abnormality.

## 2018-05-20 NOTE — ED PDOC
HPI: General Adult


Time Seen by Provider: 05/20/18 14:48


Chief Complaint (Nursing): Dizziness/Lightheaded


Chief Complaint (Provider): Dizziness


History Per: Patient


History/Exam Limitations: no limitations


Onset/Duration Of Symptoms: Hrs


Current Symptoms Are (Timing): Better


Additional Complaint(s): 





67 year old male with a past medical history of hypertension, diabetes, vertigo

, neuropathy, and chronic leg swelling who presents to the emergency department 

via ambulance after he was sitting in the bathroom and could not get up due to 

feeling weak (not new). Patient is walker dependant and states he could not get 

up himself and felt dizziness this morning which resolved on its own prompting 

him to call the ambulance. Denies chest pain, vomiting, diarrhea, headache, or 

any further medical complaints. 





Of note, patient usually takes Antivert for dizziness. 








PMD: Dr. Teofilo Steele MD 





Past Medical History


Reviewed: Historical Data, Nursing Documentation, Vital Signs


Vital Signs: 


 Last Vital Signs











Temp  97 F L  05/20/18 14:35


 


Pulse  52 L  05/20/18 14:35


 


Resp  16   05/20/18 14:35


 


BP  159/74 H  05/20/18 14:35


 


Pulse Ox  97   05/20/18 18:21














- Medical History


PMH: Back Problems (Spinal Stenosis), CAD, Diabetes, HTN, Hypercholesterolemia, 

Pneumonia, End Stage Renal Disease


   Denies: Chronic Kidney Disease





- Surgical History


Other surgeries: Foot surgery, bilateral





- Family History


Family History: States: Unknown Family Hx





- Social History


Current smoker - smoking cessation education provided: No


Alcohol: None


Drugs: Denies





- Home Medications


Home Medications: 


 Ambulatory Orders











 Medication  Instructions  Recorded


 


Aspirin [Adult Low Dose Aspirin EC] 81 mg PO DAILY 06/02/17


 


Atorvastatin Calcium 10 mg PO HS 06/02/17


 


Canagliflozin [Invokana] 300 mg PO DAILY 06/02/17


 


Gabapentin [Neurontin] 600 mg PO TID 06/02/17


 


Insulin Degludec [Tresiba 60 unit SC BID 06/02/17





Flextouch U-100]  


 


Liraglutide [Victoza 2-Surya] 1.8 mg SC HS 06/02/17


 


Meclizine HCl 12.5 mg PO TID PRN 06/02/17


 


Metformin HCl [Glucophage] 1,000 mg PO BID 06/02/17


 


Omeprazole 40 mg PO DAILY 06/02/17


 


Ramipril [Altace] 5 mg PO DAILY 06/02/17


 


Canagliflozin [Invokana] 300 mg PO HS 05/20/18


 


Carvedilol [Coreg] 6.25 mg PO BID 05/20/18


 


Cholecalciferol (Vitamin D3) 1 tab PO DAILY 05/20/18





[D3-5000 90 mg-5000 Iu]  


 


Cilostazol [Pletal] 100 mg PO BID 05/20/18


 


Ferrous Gluconate 324 mg PO HS 05/20/18


 


Furosemide [Lasix] 40 mg PO DAILY 05/20/18


 


Lactobacillus Combination No.8 16 mg PO DAILY 05/20/18





[Adult Probiotic]  


 


Linaclotide [Linzess] 145 mcg PO HS 05/20/18


 


Linagliptin [Tradjenta] 5 mg PO DAILY 05/20/18


 


Metoclopramide [Reglan] 20 mg PO TID 05/20/18


 


Nateglinide [Starlix] 60 mg PO BID 05/20/18


 


Oxybutynin [Oxybutynin Chloride] 15 mg PO HS 05/20/18


 


Proctozone Hc  05/20/18


 


Simethicone [Good Sense Gas Relief] 180 mg PO DAILY 05/20/18


 


Sulfamethoxazole/Trimethoprim 1 each PO BID #14 tablet 05/20/18





[Bactrim Ds Tablet]  


 


Tamsulosin HCl [Flomax] 0.4 mg PO DAILY 05/20/18














- Allergies


Allergies/Adverse Reactions: 


 Allergies











Allergy/AdvReac Type Severity Reaction Status Date / Time


 


No Known Allergies Allergy   Verified 05/20/18 14:35














Review of Systems


ROS Statement: Except As Marked, All Systems Reviewed And Found Negative (As 

per HPI, otherwise negative)


Constitutional: Positive for: Weakness (Generalized)


Cardiovascular: Negative for: Chest Pain


Gastrointestinal: Negative for: Vomiting, Diarrhea


Neurological: Positive for: Dizziness.  Negative for: Headache





Physical Exam





- Reviewed


Nursing Documentation Reviewed: Yes


Vital Signs Reviewed: Yes





- Physical Exam


Appears: Positive for: Well, No Acute Distress


Head Exam: Positive for: NORMAL INSPECTION


Skin: Positive for: Normal Color, Warm, Dry


Eye Exam: Positive for: Normal appearance, EOMI, PERRL


ENT: Positive for: Normal ENT Inspection.  Negative for: Pharyngeal Erythema


Cardiovascular/Chest: Positive for: Regular Rate, Rhythm.  Negative for: Murmur


Respiratory: Positive for: Normal Breath Sounds.  Negative for: Accessory 

Muscle Use, Respiratory Distress


Gastrointestinal/Abdominal: Positive for: Normal Exam, Soft.  Negative for: 

Tenderness


Extremity: Positive for: Swelling (Chronic lower leg swelling and chronic 

dermatitis. Discoloration of the lower legs. )


Neurologic/Psych: Positive for: Alert, Oriented (x3)





- Laboratory Results


Result Diagrams: 


 05/20/18 15:47





 05/20/18 17:30





- ECG


O2 Sat by Pulse Oximetry: 97 (RA)


Pulse Ox Interpretation: Normal





Medical Decision Making


Medical Decision Making: 





Time: 1511


Initial impression: Dizziness and generalized weakness. Diff include urinary 

tract infection, dehydration, electrolyte abnormality, recurrent vertigo and 

less likely cerebrovascular accident. 


Initial plan:


--EKG


--BMP


--Urine DIP 


--CBC w/ diff


--Head CT


--Reevaluation 





--EKG: Normal sinus. Normal QRS. No ST changes. Rate 65 bpm. 





Time: 1615


--Head CT


FINDINGS:





HEMORRHAGE:


No intracranial hemorrhage. 





BRAIN:


No mass effect or edema.  No atrophy or chronic microvascular ischemic changes.





VENTRICLES:


Unremarkable. No hydrocephalus. 





CALVARIUM:


Unremarkable.





PARANASAL SINUSES:


Complete opacification of left maxillary antrum with curvilinear central 

calcification, possibly reflecting allergic fungal sinusitis. Mild chronic 

ethmoid sinusitis.





MASTOID AIR CELLS:


Unremarkable as visualized. No inflammatory changes.





OTHER FINDINGS:


None.





IMPRESSION:


No intracranial mass, hemorrhage or evidence of acute infarct.  Possible 

allergic fungal sinusitis of the left maxillary sinus.  Mild chronic ethmoid 

sinusitis. No additional abnormality.








Time: 1815


--Patient is medically clear for discharge and given Rx for Bactrim DS. Advised 

to follow up with PMD and Dr. Babak Behin MD. 





Clinical Impression: Sinusitis and urinary tract infection. 








Scribe Attestation:


Documented by Geeta De, acting as a scribe for Joslyn Puri MD





Provider Scribe Attestation:


All medical record entries made by the Scribe were at my direction and 

personally dictated by me. I have reviewed the chart and agree that the record 

accurately reflects my personal performance of the history, physical exam, 

medical decision making, and the department course for this patient. I have 

also personally directed, reviewed, and agree with the discharge instructions 

and disposition.





Disposition





- Clinical Impression


Clinical Impression: 


 Sinusitis, UTI (urinary tract infection)








- Patient ED Disposition


Is Patient to be Admitted: No


Doctor Will See Patient In The: Office


Counseled Patient/Family Regarding: Studies Performed, Diagnosis, Need For 

Followup





- Disposition


Referrals: 


Behin,Babak, MD [Staff Provider] - 


Teofilo Steele [Staff Provider] - 


Disposition: Routine/Home


Disposition Time: 18:15


Condition: GOOD


Additional Instructions: 


Take your medications as instructed. Follow up with your PCP in 2-3 days. 


Prescriptions: 


Sulfamethoxazole/Trimethoprim [Bactrim Ds Tablet] 1 each PO BID #14 tablet


Instructions:  Sinusitis, Adult (DC), Urinary Tract Infection, Adult (DC)


Forms:  [employer] (Kiswahili)


Print Language: Mexican

## 2018-05-21 NOTE — CARD
--------------- APPROVED REPORT --------------





EKG Measurement

Heart [id number]

NV [id number]

[id number]

[id number]

[id number]



<Conclusion>

Sinus rhythm with marked sinus arrhythmia

Nonspecific intraventricular block

Abnormal ECG